# Patient Record
Sex: MALE | Race: BLACK OR AFRICAN AMERICAN | NOT HISPANIC OR LATINO | Employment: FULL TIME | URBAN - METROPOLITAN AREA
[De-identification: names, ages, dates, MRNs, and addresses within clinical notes are randomized per-mention and may not be internally consistent; named-entity substitution may affect disease eponyms.]

---

## 2017-12-06 ENCOUNTER — OFFICE VISIT (OUTPATIENT)
Dept: FAMILY MEDICINE | Facility: CLINIC | Age: 47
End: 2017-12-06
Payer: COMMERCIAL

## 2017-12-06 VITALS
HEART RATE: 83 BPM | SYSTOLIC BLOOD PRESSURE: 136 MMHG | WEIGHT: 241 LBS | HEIGHT: 73 IN | OXYGEN SATURATION: 99 % | TEMPERATURE: 98.5 F | DIASTOLIC BLOOD PRESSURE: 80 MMHG | BODY MASS INDEX: 31.94 KG/M2

## 2017-12-06 DIAGNOSIS — Z00.00 ENCOUNTER FOR ROUTINE ADULT HEALTH EXAMINATION WITHOUT ABNORMAL FINDINGS: Primary | ICD-10-CM

## 2017-12-06 DIAGNOSIS — Z72.0 TOBACCO ABUSE DISORDER: ICD-10-CM

## 2017-12-06 DIAGNOSIS — K21.9 GASTROESOPHAGEAL REFLUX DISEASE, ESOPHAGITIS PRESENCE NOT SPECIFIED: ICD-10-CM

## 2017-12-06 DIAGNOSIS — Z23 NEED FOR PROPHYLACTIC VACCINATION AND INOCULATION AGAINST INFLUENZA: ICD-10-CM

## 2017-12-06 DIAGNOSIS — R39.15 URINARY URGENCY: ICD-10-CM

## 2017-12-06 LAB
ALBUMIN SERPL-MCNC: 3.7 G/DL (ref 3.4–5)
ALP SERPL-CCNC: 90 U/L (ref 40–150)
ALT SERPL W P-5'-P-CCNC: 25 U/L (ref 0–70)
ANION GAP SERPL CALCULATED.3IONS-SCNC: 6 MMOL/L (ref 3–14)
AST SERPL W P-5'-P-CCNC: 24 U/L (ref 0–45)
BILIRUB SERPL-MCNC: 0.5 MG/DL (ref 0.2–1.3)
BUN SERPL-MCNC: 17 MG/DL (ref 7–30)
CALCIUM SERPL-MCNC: 9.3 MG/DL (ref 8.5–10.1)
CHLORIDE SERPL-SCNC: 106 MMOL/L (ref 94–109)
CO2 SERPL-SCNC: 31 MMOL/L (ref 20–32)
CREAT SERPL-MCNC: 1.21 MG/DL (ref 0.66–1.25)
GFR SERPL CREATININE-BSD FRML MDRD: 64 ML/MIN/1.7M2
GLUCOSE SERPL-MCNC: 86 MG/DL (ref 70–99)
HBA1C MFR BLD: 5.4 % (ref 4.3–6)
POTASSIUM SERPL-SCNC: 4.8 MMOL/L (ref 3.4–5.3)
PROT SERPL-MCNC: 7.9 G/DL (ref 6.8–8.8)
PSA SERPL-ACNC: 0.92 UG/L (ref 0–4)
SODIUM SERPL-SCNC: 143 MMOL/L (ref 133–144)
TSH SERPL DL<=0.005 MIU/L-ACNC: 1.51 MU/L (ref 0.4–4)

## 2017-12-06 PROCEDURE — 90471 IMMUNIZATION ADMIN: CPT | Performed by: FAMILY MEDICINE

## 2017-12-06 PROCEDURE — 99396 PREV VISIT EST AGE 40-64: CPT | Mod: 25 | Performed by: FAMILY MEDICINE

## 2017-12-06 PROCEDURE — 80053 COMPREHEN METABOLIC PANEL: CPT | Performed by: FAMILY MEDICINE

## 2017-12-06 PROCEDURE — 83036 HEMOGLOBIN GLYCOSYLATED A1C: CPT | Performed by: FAMILY MEDICINE

## 2017-12-06 PROCEDURE — 36415 COLL VENOUS BLD VENIPUNCTURE: CPT | Performed by: FAMILY MEDICINE

## 2017-12-06 PROCEDURE — 99213 OFFICE O/P EST LOW 20 MIN: CPT | Mod: 25 | Performed by: FAMILY MEDICINE

## 2017-12-06 PROCEDURE — 90686 IIV4 VACC NO PRSV 0.5 ML IM: CPT | Performed by: FAMILY MEDICINE

## 2017-12-06 PROCEDURE — 84443 ASSAY THYROID STIM HORMONE: CPT | Performed by: FAMILY MEDICINE

## 2017-12-06 PROCEDURE — 84153 ASSAY OF PSA TOTAL: CPT | Performed by: FAMILY MEDICINE

## 2017-12-06 RX ORDER — VARENICLINE TARTRATE 1 MG/1
1 TABLET, FILM COATED ORAL 2 TIMES DAILY
Qty: 56 TABLET | Refills: 2 | Status: SHIPPED | OUTPATIENT
Start: 2017-12-06 | End: 2022-10-19

## 2017-12-06 ASSESSMENT — PAIN SCALES - GENERAL: PAINLEVEL: MODERATE PAIN (5)

## 2017-12-06 NOTE — LETTER
December 7, 2017      Erik Guardado  [address]  [place] [address]            Mr. Guardado,    All of your labs were normal for you.    Please contact the clinic if you have additional questions.  Thank you.    Sincerely,    Tricia Maldonado/ak        Results for orders placed or performed in visit on 12/06/17   PSA, screen   Result Value Ref Range    PSA 0.92 0 - 4 ug/L   Hemoglobin A1c   Result Value Ref Range    Hemoglobin A1C 5.4 4.3 - 6.0 %   Comprehensive metabolic panel   Result Value Ref Range    Sodium 143 133 - 144 mmol/L    Potassium 4.8 3.4 - 5.3 mmol/L    Chloride 106 94 - 109 mmol/L    Carbon Dioxide 31 20 - 32 mmol/L    Anion Gap 6 3 - 14 mmol/L    Glucose 86 70 - 99 mg/dL    Urea Nitrogen 17 7 - 30 mg/dL    Creatinine 1.21 0.66 - 1.25 mg/dL    GFR Estimate 64 >60 mL/min/1.7m2    GFR Estimate If Black 77 >60 mL/min/1.7m2    Calcium 9.3 8.5 - 10.1 mg/dL    Bilirubin Total 0.5 0.2 - 1.3 mg/dL    Albumin 3.7 3.4 - 5.0 g/dL    Protein Total 7.9 6.8 - 8.8 g/dL    Alkaline Phosphatase 90 40 - 150 U/L    ALT 25 0 - 70 U/L    AST 24 0 - 45 U/L   TSH with free T4 reflex   Result Value Ref Range    TSH 1.51 0.40 - 4.00 mU/L

## 2017-12-06 NOTE — PROGRESS NOTES
SUBJECTIVE:   CC: Erik Guardado is an 46 year old male who presents for preventative health visit.     Healthy Habits:    Do you get at least three servings of calcium containing foods daily (dairy, green leafy vegetables, etc.)? no    Amount of exercise or daily activities, outside of work: 7 day(s) per week    Problems taking medications regularly not applicable    Medication side effects: No    Have you had an eye exam in the past two years? yes    Do you see a dentist twice per year? no    Do you have sleep apnea, excessive snoring or daytime drowsiness?yes. Getting 6 hours or less of sleep at night.    Works as a .    Urinary urgency for months.  No specific trigger.  Having dribbling and reduced stream as well. No problems with erections.    Chest burning and pain for months. Accompanied by acid taste in mouth.  Eats a lot of fast food and has 5 pop's per day with 3 energy drinks per week.    RESPIRATORY SYMPTOMS      Duration: x 1 week    Description  nasal congestion, cough, headache, fatigue/malaise and chest pain    Severity: moderate    Accompanying signs and symptoms: None    History (predisposing factors):  smoker    Precipitating or alleviating factors: None    Therapies tried and outcome:  OTC cold        Today's PHQ-2 Score:   PHQ-2 ( 1999 Pfizer) 12/6/2017 8/27/2014   Q1: Little interest or pleasure in doing things 0 0   Q2: Feeling down, depressed or hopeless 0 0   PHQ-2 Score 0 0         Abuse: Current or Past(Physical, Sexual or Emotional)- No  Do you feel safe in your environment - Yes  Social History   Substance Use Topics     Smoking status: Current Every Day Smoker     Packs/day: 0.50     Types: Cigarettes     Smokeless tobacco: Never Used     Alcohol use No     The patient does not drink >3 drinks per day nor >7 drinks per week.    Last PSA: No results found for: PSA    Reviewed orders with patient. Reviewed health maintenance and updated orders accordingly - Yes  Patient Active Problem  "List   Diagnosis     CARDIOVASCULAR SCREENING; LDL GOAL LESS THAN 160     Tobacco use disorder     Obesity, Class I, BMI 30-34.9     Gastroesophageal reflux disease, esophagitis presence not specified     Tobacco abuse disorder     Urinary urgency     No past surgical history on file.    Social History   Substance Use Topics     Smoking status: Current Every Day Smoker     Packs/day: 0.50     Types: Cigarettes     Smokeless tobacco: Never Used     Alcohol use No     Family History   Problem Relation Age of Onset     Unknown/Adopted Father      CANCER No family hx of      DIABETES No family hx of      CEREBROVASCULAR DISEASE No family hx of      Thyroid Disease No family hx of      Glaucoma No family hx of      Macular Degeneration No family hx of      Hypertension Maternal Grandmother              Reviewed and updated as needed this visit by clinical staffTobacco  Allergies  Meds  Problems         Reviewed and updated as needed this visit by Provider  Allergies  Meds  Problems              ROS:CONSTITUTIONAL:POSITIVE  for fatigue  I: NEGATIVE for worrisome rashes, moles or lesions  E: NEGATIVE for vision changes or irritation  ENT: NEGATIVE for ear, mouth and throat problems  R: NEGATIVE for significant cough or SOB  CV: NEGATIVE for chest pain, palpitations or peripheral edema  GI: POSITIVE for dyspepsia   male: decreased urinary stream, incontinence and terminal dribbling  MUSCULOSKELETAL:POSITIVE  for back pain low  and joint pain bilateral knees  N: NEGATIVE for weakness, dizziness or paresthesias  P: NEGATIVE for changes in mood or affect    OBJECTIVE:   /80  Pulse 83  Temp 98.5  F (36.9  C) (Oral)  Ht 6' 1\" (1.854 m)  Wt 241 lb (109.3 kg)  SpO2 99%  BMI 31.8 kg/m2  EXAM:  GENERAL: healthy, alert and no distress  EYES: Eyes grossly normal to inspection, PERRL and conjunctivae and sclerae normal  HENT: ear canals and TM's normal, nose and mouth without ulcers or lesions  NECK: no adenopathy, " no asymmetry, masses, or scars and thyroid normal to palpation  RESP: lungs clear to auscultation - no rales, rhonchi or wheezes  CV: regular rate and rhythm, normal S1 S2, no S3 or S4, no murmur, click or rub, no peripheral edema and peripheral pulses strong  ABDOMEN: soft, nontender, no hepatosplenomegaly, no masses and bowel sounds normal  MS: decreased lumbar lordosis, crepitus bilateral knees  SKIN: no suspicious lesions or rashes  NEURO: Normal strength and tone, mentation intact and speech normal  PSYCH: mentation appears normal, affect normal/bright    ASSESSMENT/PLAN:   1. Encounter for routine adult health examination without abnormal findings  Routine preventive discussed    2. Urinary urgency  Possible etiologies discussed - check labs and recommended decrease caffeine and nicotine  - PSA, screen  - Hemoglobin A1c  - Comprehensive metabolic panel  - TSH with free T4 reflex    3. Tobacco abuse disorder  Trial of chantix.  - varenicline (CHANTIX STARTING MONTH PAK) 0.5 MG X 11 & 1 MG X 42 tablet; Take 0.5 mg tab daily for 3 days, then 0.5 mg tab twice daily for 4 days, then 1 mg twice daily.  Dispense: 53 tablet; Refill: 0  - varenicline (CHANTIX) 1 MG tablet; Take 1 tablet (1 mg) by mouth 2 times daily  Dispense: 56 tablet; Refill: 2    4. Gastroesophageal reflux disease, esophagitis presence not specified  Discussed dietary changes    5. Need for prophylactic vaccination and inoculation against influenza    - FLU VAC, SPLIT VIRUS IM > 3 YO (QUADRIVALENT) [29672]  - Vaccine Administration, Initial [60716]    The uses and side effects, including black box warnings as appropriate, were discussed in detail.  All patient questions were answered.  The patient was instructed to call immediately if any side effects developed.     Follow up in 2 - 4 weeks if not improved.    COUNSELING:  Reviewed preventive health counseling, as reflected in patient instructions    BP Screening:   Last 3 BP Readings:    BP  "Readings from Last 3 Encounters:   12/06/17 136/80   08/27/14 117/73   02/23/14 137/84       The following was recommended to the patient:  Re-screen BP within a year and recommended lifestyle modifications       reports that he has been smoking Cigarettes.  He has been smoking about 0.50 packs per day. He has never used smokeless tobacco.  Tobacco Cessation Action Plan: Pharmacotherapies : Chantix  Estimated body mass index is 31.8 kg/(m^2) as calculated from the following:    Height as of this encounter: 6' 1\" (1.854 m).    Weight as of this encounter: 241 lb (109.3 kg).   Weight management plan: Discussed healthy diet and exercise guidelines and patient will follow up in 12 months in clinic to re-evaluate.    Counseling Resources:  ATP IV Guidelines  Pooled Cohorts Equation Calculator  FRAX Risk Assessment  ICSI Preventive Guidelines  Dietary Guidelines for Americans, 2010  USDA's MyPlate  ASA Prophylaxis  Lung CA Screening    Tricia Maldonado MD  [facility]  "

## 2017-12-06 NOTE — MR AVS SNAPSHOT
After Visit Summary   12/6/2017    Erik Guardado    MRN: [id number]           Patient Information     Date Of Birth          1970        Visit Information        Provider Department      12/6/2017 10:20 AM Tricia Lam MD [facility]        Today's Diagnoses     Encounter for routine adult health examination without abnormal findings    -  1    Need for prophylactic vaccination and inoculation against influenza        Urinary urgency        Tobacco abuse disorder          Care Instructions    At [facility], we strive to deliver an exceptional experience to you, every time we see you.  If you receive a survey in the mail, please send us back your thoughts. We really do value your feedback.    Based on your medical history, these are the current health maintenance/preventive care services that you are due for (some may have been done at this visit.)  Health Maintenance Due   Topic Date Due     EYE EXAM Q1 YEAR  08/25/2015     INFLUENZA VACCINE (SYSTEM ASSIGNED)  09/01/2017         Suggested websites for health information:  Www.[employer] : Up to date and easily searchable information on multiple topics.  Www.medlineplus.gov : medication info, interactive tutorials, watch real surgeries online  Www.familydoctor.org : good info from the Academy of Family Physicians  Www.cdc.gov : public health info, travel advisories, epidemics (H1N1)  Www.aap.org : children's health info, normal development, vaccinations  Www.health.state.[place].us : [place] dept of health, public health issues in [place], N1N1    Your care team:                            Family Medicine Internal Medicine   MD Kade Rodríguez MD Shantel Branch-Fleming, MD Katya Georgiev PA-C Nam Ho, MD Pediatrics   HORTENSIA Berger, MD Chastity Wisdom CNP, MD Deborah Mielke, MD Kim Thein, APRN CNP      Clinic hours:  Monday - Thursday 7 am-7 pm; Fridays 7 am-5 pm.   Urgent care: Monday - Friday 11 am-9 pm; Saturday and Sunday 9 am-5 pm.  Pharmacy : Monday -Thursday 8 am-8 pm; Friday 8 am-6 pm; Saturday and Sunday 9 am-5 pm.     Clinic: [phone]   Pharmacy: [phone]      Preventive Health Recommendations  Male Ages 40 to 49    Yearly exam:             See your health care provider every year in order to  o   Review health changes.   o   Discuss preventive care.    o   Review your medicines if your doctor has prescribed any.    You should be tested each year for STDs (sexually transmitted diseases) if you re at risk.     Have a cholesterol test every 5 years.     Have a colonoscopy (test for colon cancer) if someone in your family has had colon cancer or polyps before age 50.     After age 45, have a diabetes test (fasting glucose). If you are at risk for diabetes, you should have this test every 3 years.      Talk with your health care provider about whether or not a prostate cancer screening test (PSA) is right for you.    Shots: Get a flu shot each year. Get a tetanus shot every 10 years.     Nutrition:    Eat at least 5 servings of fruits and vegetables daily.     Eat whole-grain bread, whole-wheat pasta and brown rice instead of white grains and rice.     Talk to your provider about Calcium and Vitamin D.     Lifestyle    Exercise for at least 150 minutes a week (30 minutes a day, 5 days a week). This will help you control your weight and prevent disease.     Limit alcohol to one drink per day.     No smoking.     Wear sunscreen to prevent skin cancer.     See your dentist every six months for an exam and cleaning.              Follow-ups after your visit        Who to contact     If you have questions or need follow up information about today's clinic visit or your schedule please contact [facility] directly at [phone].  Normal or non-critical lab and imaging results will be communicated  "to you by [employer], letter or phone within 4 business days after the clinic has received the results. If you do not hear from us within 7 days, please contact the clinic through [employer] or phone. If you have a critical or abnormal lab result, we will notify you by phone as soon as possible.  Submit refill requests through [employer] or call your pharmacy and they will forward the refill request to us. Please allow 3 business days for your refill to be completed.          Additional Information About Your Visit        [employer] Information     [employer] lets you send messages to your doctor, view your test results, renew your prescriptions, schedule appointments and more. To sign up, go to [email or web address] . Click on \"Log in\" on the left side of the screen, which will take you to the Welcome page. Then click on \"Sign up Now\" on the right side of the page.     You will be asked to enter the access code listed below, as well as some personal information. Please follow the directions to create your username and password.     Your access code is: [email or web address]  Expires: 3/6/2018 11:03 AM     Your access code will  in 90 days. If you need help or a new code, please call your [facility] clinic or [phone].        Care EveryWhere ID     This is your Care EveryWhere ID. This could be used by other organizations to access your [facility] medical records  [id number]        Your Vitals Were     Pulse Temperature Height Pulse Oximetry BMI (Body Mass Index)       83 98.5  F (36.9  C) (Oral) 6' 1\" (1.854 m) 99% 31.8 kg/m2        Blood Pressure from Last 3 Encounters:   17 136/80   14 117/73   14 137/84    Weight from Last 3 Encounters:   17 241 lb (109.3 kg)   14 232 lb (105.2 kg)   14 232 lb 9.6 oz (105.5 kg)              We Performed the Following     Comprehensive metabolic panel     Hemoglobin A1c     PSA, screen     TSH with free T4 reflex          Today's Medication Changes        "   These changes are accurate as of: 12/6/17 11:03 AM.  If you have any questions, ask your nurse or doctor.               These medicines have changed or have updated prescriptions.        Dose/Directions    * varenicline 0.5 MG X 11 & 1 MG X 42 tablet   Commonly known as:  CHANTIX STARTING MONTH PAK   This may have changed:  Another medication with the same name was added. Make sure you understand how and when to take each.   Used for:  Tobacco abuse disorder   Changed by:  Tricia Lam MD        Take 0.5 mg tab daily for 3 days, then 0.5 mg tab twice daily for 4 days, then 1 mg twice daily.   Quantity:  53 tablet   Refills:  0       * varenicline 1 MG tablet   Commonly known as:  CHANTIX   This may have changed:  You were already taking a medication with the same name, and this prescription was added. Make sure you understand how and when to take each.   Used for:  Tobacco abuse disorder   Changed by:  Tricia Lam MD        Dose:  1 mg   Take 1 tablet (1 mg) by mouth 2 times daily   Quantity:  56 tablet   Refills:  2       * Notice:  This list has 2 medication(s) that are the same as other medications prescribed for you. Read the directions carefully, and ask your doctor or other care provider to review them with you.         Where to get your medicines      These medications were sent to [id number] - [place], [place] - [address] [facility]  [address] [place] [id number]     Phone:  [phone]     varenicline 0.5 MG X 11 & 1 MG X 42 tablet    varenicline 1 MG tablet                Primary Care Provider Office Phone # Fax #    Sravan Huitron [phone] [address] [facility] [address]        Equal Access to Services     [facility] : Hadii cecilia Greer, flaquita ceja, qaybta etelvina salas, ellis kovacs. So [facility] [phone].    ATENCIÓN: Si mitch baeza, malcolm perales cast  disposición servicios gratuitos de asistencia lingüística. Rod kline [phone].    We comply with applicable federal civil rights laws and [place] laws. We do not discriminate on the basis of race, color, national origin, age, disability, sex, sexual orientation, or gender identity.            Thank you!     Thank you for choosing [facility]  for your care. Our goal is always to provide you with excellent care. Hearing back from our patients is one way we can continue to improve our services. Please take a few minutes to complete the written survey that you may receive in the mail after your visit with us. Thank you!             Your Updated Medication List - Protect others around you: Learn how to safely use, store and throw away your medicines at www.disposemymeds.org.          This list is accurate as of: 12/6/17 11:03 AM.  Always use your most recent med list.                   Brand Name Dispense Instructions for use Diagnosis    * varenicline 0.5 MG X 11 & 1 MG X 42 tablet    CHANTIX STARTING MONTH BONG    53 tablet    Take 0.5 mg tab daily for 3 days, then 0.5 mg tab twice daily for 4 days, then 1 mg twice daily.    Tobacco abuse disorder       * varenicline 1 MG tablet    CHANTIX    56 tablet    Take 1 tablet (1 mg) by mouth 2 times daily    Tobacco abuse disorder       * Notice:  This list has 2 medication(s) that are the same as other medications prescribed for you. Read the directions carefully, and ask your doctor or other care provider to review them with you.

## 2017-12-06 NOTE — NURSING NOTE
"Chief Complaint   Patient presents with     Physical       Initial /83  Pulse 83  Temp 98.5  F (36.9  C) (Oral)  Ht 6' 1\" (1.854 m)  Wt 241 lb (109.3 kg)  SpO2 99%  BMI 31.8 kg/m2 Estimated body mass index is 31.8 kg/(m^2) as calculated from the following:    Height as of this encounter: 6' 1\" (1.854 m).    Weight as of this encounter: 241 lb (109.3 kg).  Medication Reconciliation: complete   Bernabe VALDERRAMA      "

## 2017-12-06 NOTE — PROGRESS NOTES

## 2017-12-07 NOTE — PROGRESS NOTES
MrAleah Guardado,    All of your labs were normal for you.    Please contact the clinic if you have additional questions.  Thank you.    Sincerely,    Tricia Maldonado

## 2019-10-24 ENCOUNTER — TELEPHONE (OUTPATIENT)
Dept: FAMILY MEDICINE | Facility: CLINIC | Age: 49
End: 2019-10-24

## 2019-10-24 NOTE — TELEPHONE ENCOUNTER
Panel Management Review   One phone call and send letter if unable to reach them or [employer] message and send letter if not read after 2 weeks (You will get a message to your inbasket)      BP Readings from Last 1 Encounters:   12/06/17 136/80        Health Maintenance Due   Topic Date Due     COLONOSCOPY  12/17/1978     PNEUMOCOCCAL IMMUNIZATION 19-64 MEDIUM RISK (1 of 1 - PPSV23) 12/17/1987     EYE EXAM  08/25/2015     PREVENTIVE CARE VISIT  12/06/2018     PHQ-2  01/01/2019     LIPID  08/27/2019     INFLUENZA VACCINE (1) 09/01/2019     ZOSTER IMMUNIZATION (1 of 2) 12/17/2018        Fail List measure: BMI        Patient is due/failing the following:   BMI    Action needed:   Patient needs office visit for Preventive Care Visit.    Type of outreach:    Phone, spoke to patient.  Appt 10/30 2:20p.m.    Questions for provider review:    None                                                                                                                    Keli Johnson

## 2021-08-13 ENCOUNTER — OFFICE VISIT (OUTPATIENT)
Dept: URGENT CARE | Facility: URGENT CARE | Age: 51
End: 2021-08-13
Payer: COMMERCIAL

## 2021-08-13 VITALS
RESPIRATION RATE: 16 BRPM | DIASTOLIC BLOOD PRESSURE: 83 MMHG | OXYGEN SATURATION: 98 % | WEIGHT: 243.4 LBS | BODY MASS INDEX: 32.11 KG/M2 | SYSTOLIC BLOOD PRESSURE: 148 MMHG | TEMPERATURE: 97.6 F | HEART RATE: 84 BPM

## 2021-08-13 DIAGNOSIS — S09.90XA INJURY OF HEAD, INITIAL ENCOUNTER: Primary | ICD-10-CM

## 2021-08-13 DIAGNOSIS — S16.1XXA STRAIN OF NECK MUSCLE, INITIAL ENCOUNTER: ICD-10-CM

## 2021-08-13 DIAGNOSIS — S06.0X0A CONCUSSION WITHOUT LOSS OF CONSCIOUSNESS, INITIAL ENCOUNTER: ICD-10-CM

## 2021-08-13 PROCEDURE — 99203 OFFICE O/P NEW LOW 30 MIN: CPT | Performed by: PHYSICIAN ASSISTANT

## 2021-08-13 ASSESSMENT — ENCOUNTER SYMPTOMS
FEVER: 0
SORE THROAT: 0
ARTHRALGIAS: 1
VOMITING: 0
WHEEZING: 0
CHEST TIGHTNESS: 0
CARDIOVASCULAR NEGATIVE: 1
CHILLS: 0
HEMATURIA: 0
COUGH: 0
RESPIRATORY NEGATIVE: 1
NAUSEA: 0
DIARRHEA: 0
CONSTITUTIONAL NEGATIVE: 1
HEADACHES: 1
PALPITATIONS: 0
FREQUENCY: 0
GASTROINTESTINAL NEGATIVE: 1
DYSURIA: 0
SHORTNESS OF BREATH: 0
ALLERGIC/IMMUNOLOGIC NEGATIVE: 1
MYALGIAS: 0
ABDOMINAL PAIN: 0

## 2021-08-13 ASSESSMENT — PAIN SCALES - GENERAL: PAINLEVEL: MODERATE PAIN (5)

## 2021-08-13 NOTE — PROGRESS NOTES
Chief Complaint:    Chief Complaint   Patient presents with     Work Comp     Patient states that two panels fell over from the back of a truck about 45lbs each- hitting him in the back of the head- patient is having a headache- feels a little off- having shoulder and upper back pain. Patient bit down on his tounge when it happened, that is doing a little better.          Medical Decision Making:    Vital signs reviewed by Allen Flores PA-C  BP (!) 148/83   Pulse 84   Temp 97.6  F (36.4  C) (Tympanic)   Resp 16   Wt 110.4 kg (243 lb 6.4 oz)   SpO2 98%   BMI 32.11 kg/m      Differential Diagnosis:  Concussion, neck sprain, neck fracture     ASSESSMENT:     1. Injury of head, initial encounter    2. Concussion without loss of consciousness, initial encounter    3. Strain of neck muscle, initial encounter           PLAN:     Patient is in no acute distress.  Headache is improving.  No neck pain.  Neuro exam was benign  Ice the affected areas.  Ibuprofen for pain.  Patient instructed to follow up with PCP in 1 week if symptoms are not improving.  Sooner if symptoms worsen.  Worrisome symptoms discussed with instructions to go to the ED.  Patient verbalized understanding and agreed with this plan.    Labs:     No results found for any visits on 08/13/21.    Current Meds:    Current Outpatient Medications:      varenicline (CHANTIX STARTING MONTH PAK) 0.5 MG X 11 & 1 MG X 42 tablet, Take 0.5 mg tab daily for 3 days, then 0.5 mg tab twice daily for 4 days, then 1 mg twice daily. (Patient not taking: Reported on 8/13/2021), Disp: 53 tablet, Rfl: 0     varenicline (CHANTIX) 1 MG tablet, Take 1 tablet (1 mg) by mouth 2 times daily (Patient not taking: Reported on 8/13/2021), Disp: 56 tablet, Rfl: 2    Allergies:  No Known Allergies    SUBJECTIVE    HPI: Erik Guardado is an 50 year old male who presents for evaluation and treatment of head injury.  Patient was at work and had some items fall off the back of a truck and  strike him on the back of the head.  He did not lose consciousness.  He is complaining of a headache, and some neck pain.  He did bite his tongue, but that pain has resolved.  He did not lose consciousness.  He denies any dizziness, nausea or vomiting.  No Hx of head injury in the past.  No numbness, tingling or weakness in the extremities.    ROS:      Review of Systems   Constitutional: Negative.  Negative for chills and fever.   HENT: Negative.  Negative for sore throat.    Respiratory: Negative.  Negative for cough, chest tightness, shortness of breath and wheezing.    Cardiovascular: Negative.  Negative for chest pain and palpitations.   Gastrointestinal: Negative.  Negative for abdominal pain, diarrhea, nausea and vomiting.   Genitourinary: Negative for dysuria, frequency, hematuria and urgency.   Musculoskeletal: Positive for arthralgias. Negative for myalgias.   Skin: Negative for rash.   Allergic/Immunologic: Negative.  Negative for immunocompromised state.   Neurological: Positive for headaches.        Family History   Family History   Problem Relation Age of Onset     Unknown/Adopted Father      Cancer No family hx of      Diabetes No family hx of      Cerebrovascular Disease No family hx of      Thyroid Disease No family hx of      Glaucoma No family hx of      Macular Degeneration No family hx of      Hypertension Maternal Grandmother        Social History  Social History     Socioeconomic History     Marital status: Single     Spouse name: Not on file     Number of children: Not on file     Years of education: Not on file     Highest education level: Not on file   Occupational History     Not on file   Tobacco Use     Smoking status: Former Smoker     Packs/day: 0.50     Types: Cigarettes     Smokeless tobacco: Never Used   Substance and Sexual Activity     Alcohol use: No     Drug use: No     Sexual activity: Yes     Partners: Female   Other Topics Concern     Parent/sibling w/ CABG, MI or angioplasty  before 65F 55M? No   Social History Narrative     Not on file     Social Determinants of Health     Financial Resource Strain:      Difficulty of Paying Living Expenses:    Food Insecurity:      Worried About Running Out of Food in the Last Year:      Ran Out of Food in the Last Year:    Transportation Needs:      Lack of Transportation (Medical):      Lack of Transportation (Non-Medical):    Physical Activity:      Days of Exercise per Week:      Minutes of Exercise per Session:    Stress:      Feeling of Stress :    Social Connections:      Frequency of Communication with Friends and Family:      Frequency of Social Gatherings with Friends and Family:      Attends Jewish Services:      Active Member of Clubs or Organizations:      Attends Club or Organization Meetings:      Marital Status:    Intimate Partner Violence:      Fear of Current or Ex-Partner:      Emotionally Abused:      Physically Abused:      Sexually Abused:         Surgical History:  No past surgical history on file.     Problem List:  Patient Active Problem List   Diagnosis     CARDIOVASCULAR SCREENING; LDL GOAL LESS THAN 160     Tobacco use disorder     Obesity, Class I, BMI 30-34.9     Gastroesophageal reflux disease, esophagitis presence not specified     Tobacco abuse disorder     Urinary urgency           OBJECTIVE:     Vital signs noted and reviewed by Allen Flores PA-C  BP (!) 148/83   Pulse 84   Temp 97.6  F (36.4  C) (Tympanic)   Resp 16   Wt 110.4 kg (243 lb 6.4 oz)   SpO2 98%   BMI 32.11 kg/m       PEFR:    Physical Exam  Vitals and nursing note reviewed.   Constitutional:       General: He is not in acute distress.     Appearance: He is well-developed. He is not ill-appearing, toxic-appearing or diaphoretic.   HENT:      Head: Normocephalic and atraumatic.      Right Ear: Hearing, tympanic membrane, ear canal and external ear normal. Tympanic membrane is not perforated, erythematous, retracted or bulging.      Left Ear:  Hearing, tympanic membrane, ear canal and external ear normal. Tympanic membrane is not perforated, erythematous, retracted or bulging.      Nose: Nose normal. No mucosal edema, congestion or rhinorrhea.      Mouth/Throat:      Pharynx: No oropharyngeal exudate or posterior oropharyngeal erythema.      Tonsils: No tonsillar exudate or tonsillar abscesses. 0 on the right. 0 on the left.   Eyes:      Pupils: Pupils are equal, round, and reactive to light.   Cardiovascular:      Rate and Rhythm: Normal rate and regular rhythm.      Heart sounds: Normal heart sounds, S1 normal and S2 normal. Heart sounds not distant. No murmur heard.   No friction rub. No gallop.    Pulmonary:      Effort: Pulmonary effort is normal. No respiratory distress.      Breath sounds: Normal breath sounds. No decreased breath sounds, wheezing, rhonchi or rales.   Abdominal:      General: Bowel sounds are normal. There is no distension.      Palpations: Abdomen is soft.      Tenderness: There is no abdominal tenderness.   Musculoskeletal:      Cervical back: Normal range of motion and neck supple. No edema, erythema, rigidity or crepitus. No pain with movement, spinous process tenderness or muscular tenderness. Normal range of motion.   Lymphadenopathy:      Cervical: No cervical adenopathy.   Skin:     General: Skin is warm and dry.      Findings: No rash.   Neurological:      Mental Status: He is alert.      GCS: GCS eye subscore is 4. GCS verbal subscore is 5. GCS motor subscore is 6.      Cranial Nerves: Cranial nerves are intact. No cranial nerve deficit.      Sensory: Sensation is intact.      Motor: Motor function is intact. No weakness, atrophy or abnormal muscle tone.      Coordination: Coordination is intact. Coordination normal.      Gait: Gait is intact. Gait normal.   Psychiatric:         Attention and Perception: He is attentive.         Speech: Speech normal.         Behavior: Behavior normal. Behavior is cooperative.          Thought Content: Thought content normal.         Judgment: Judgment normal.             Allen Flores PA-C  8/13/2021, 4:17 PM

## 2021-08-13 NOTE — PATIENT INSTRUCTIONS
"  Patient Education   Concussion Discharge Instructions  You were seen today for signs of a concussion. The symptoms will vary, depending on the nature of your injury and your health. You may have: headache, confusion, nausea (feel sick to your stomach), vomiting (throwing up) and problems with memory, concentrating or sleep. You may feel dizzy, irritable, and tired.   Children and teens may need help from their parents, teachers and coaches to watch for symptoms as they recover.  Follow-up  It is important for you to see a doctor for follow-up care to see how you are recovering. Please see your primary doctor within the next 5 to 7 days. You may have also been referred to the Concussion  service. They will contact you and arrange a follow-up visit if needed. If you need help sooner, you may call them at [phone].  Warning signs  Call your doctor or come back to Emergency if you suddenly have any of these symptoms:    Headaches that get worse    Feeling more and more drowsy    You keep repeating yourself    Strange behavior    Seizures    Repeat vomiting (throwing up)    Trouble walking    Growing confusion    Feeling more irritable    Neck pain that gets worse    Slurred speech    Weakness or numbness    Loss of consciousness    Fluid or blood coming from ears or nose  Self-care    Get lots of rest and get enough sleep at night. Take daytime naps or rest if you feel tired.    Limit physical activity and \"thinking\" activities. These can make symptoms worse.  ? Physical activity includes gym, sports, weight training, running, exercise and heavy lifting.  ? Thinking activities include homework, class work, job-related work and screen time (phone, computer, tablet, TV and video games).    Stick to a healthy diet and drink lots of fluids.    As symptoms improve, you may slowly return to your daily activities. If symptoms get worse   or return, reduce your activity.    Know that it is normal to feel sad and " frustrated when you do not feel right and are less active.  Going back to work    Your care team will tell you when you are ready to return to work.    Limit the amount of work you do soon after your injury. This may speed healing. Take breaks if your symptoms get worse. You should also reduce your physical activity as well as activities that require a lot of thinking until you see your doctor.    You may need shorter work days and a lighter workload.    Avoid heavy lifting, working with machinery, driving and working at heights until your symptoms are gone or you are cleared by a doctor.  Returning to sports    Never return to play if you have any symptoms. A full recovery will reduce the chances of getting hurt again. Remember, it is better to miss one or two games than a whole season.    You should rest from all physical activity until you see your doctor. Generally, if all symptoms have completely cleared, your doctor can help guide you to slowly return to sports. If symptoms return or worsen, stop the activity and see your doctor.    Important: If you are in an organized sport and under age 18, you will need written consent from a healthcare provider before you return to sports. Typically, this will be your primary care or sports medicine doctor. Please make an appointment.  Going back to school    If you are still having symptoms, you may need extra help at school.    Tell your teachers and school nurse about your injury and symptoms. Ask them to watch for problems with learning, memory and concentrating. Symptoms may get worse when you do schoolwork, and you may become more irritable.    You may need shorter school days, a reduced workload, and to postpone testing.    Do not drive or take gym class (physical activity) until cleared by a doctor.    For informational purposes only. Not to replace the advice of your health care provider.   2009 [employer]. Used with permission.  "This form is adapted from the \"Heads Up: Brain Injury in Your Practice\" tool kit developed by the Centers for Disease Control and Prevention (CDC). All rights reserved. [facility]. Clinically reviewed by [employer]. [employer] 541845 - Rev 11/20.       Patient Education     Neck Sprain or Strain  A sudden force that causes turning or bending of the neck can cause sprain or strain. An example would be the force from a car accident. This can stretch or tear muscles called a strain. It can also stretch or tear ligaments called a sprain. Either of these can cause neck pain. Sometimes neck pain occurs after a simple awkward movement. In either case, muscle spasm is commonly present and contributes to the pain.   Unless you had a forceful physical injury (for example, a car accident or fall), X-rays are often not ordered for the initial evaluation of neck pain. If pain continues and does not respond to medical treatment, X-rays and other tests may be done later.  Home care    You may feel more soreness and spasm the first few days after the injury. Rest until symptoms start to improve.    When lying down, use a comfortable pillow or a rolled towel that supports the head and keeps the spine in a neutral position. The position of the head should not be tilted forward or backward.    Apply an ice pack over the injured area for 15 to 20 minutes every 3 to 6 hours. Do this for the first 24 to 48 hours. You can make an ice pack by filling a plastic bag that seals at the top with ice cubes and then wrapping it with a thin towel. After 48 hours, apply heat (warm shower or warm bath) for 15 to 20 minutes several times a day, or alternate ice and heat.    You may use over-the-counter pain medicine to control pain, unless another pain medicine was prescribed. If you have chronic liver or kidney disease or ever had a stomach ulcer or gastrointestinal bleeding, talk with your healthcare provider before " using these medicines.    If a soft cervical collar was prescribed, only ear it for periods of increased pain. It should not be worn for more than 3 hours a day, or for longer than 1 to 2 weeks.  Follow-up care  Follow up with your healthcare provider, or as directed. Physical therapy may be needed.  Sometimes fractures don t show up on the first X-ray. Bruises and sprains can sometimes hurt as much as a fracture. These injuries can take time to heal completely. If your symptoms don t improve or they get worse, talk with your healthcare provider. You may need a repeat X-ray or other tests. If X-rays were taken, you will be told of any new findings that may affect your care.  Call 911  Call 911 if you have:    Neck swelling, difficulty or painful swallowing    Trouble breathing    Chest pain  When to seek medical advice  Call your healthcare provider right away if any of these occur:    Pain becomes worse or spreads into your arms or legs    Weakness or numbness in one or both arms or legs  [employer] last reviewed this educational content on 5/1/2018 2000-2021 [employer]. All rights reserved. This information is not intended as a substitute for professional medical care. Always follow your healthcare professional's instructions.

## 2021-09-01 ENCOUNTER — OFFICE VISIT (OUTPATIENT)
Dept: URGENT CARE | Facility: URGENT CARE | Age: 51
End: 2021-09-01
Payer: COMMERCIAL

## 2021-09-01 VITALS
RESPIRATION RATE: 20 BRPM | TEMPERATURE: 101.8 F | DIASTOLIC BLOOD PRESSURE: 91 MMHG | SYSTOLIC BLOOD PRESSURE: 145 MMHG | HEART RATE: 91 BPM | OXYGEN SATURATION: 100 %

## 2021-09-01 DIAGNOSIS — Z20.822 SUSPECTED COVID-19 VIRUS INFECTION: ICD-10-CM

## 2021-09-01 DIAGNOSIS — R05.9 COUGH: Primary | ICD-10-CM

## 2021-09-01 PROCEDURE — U0003 INFECTIOUS AGENT DETECTION BY NUCLEIC ACID (DNA OR RNA); SEVERE ACUTE RESPIRATORY SYNDROME CORONAVIRUS 2 (SARS-COV-2) (CORONAVIRUS DISEASE [COVID-19]), AMPLIFIED PROBE TECHNIQUE, MAKING USE OF HIGH THROUGHPUT TECHNOLOGIES AS DESCRIBED BY CMS-2020-01-R: HCPCS | Performed by: PHYSICIAN ASSISTANT

## 2021-09-01 PROCEDURE — U0005 INFEC AGEN DETEC AMPLI PROBE: HCPCS | Performed by: PHYSICIAN ASSISTANT

## 2021-09-01 PROCEDURE — 99213 OFFICE O/P EST LOW 20 MIN: CPT | Performed by: PHYSICIAN ASSISTANT

## 2021-09-01 NOTE — LETTER
September 1, 2021  RE:  Erik Guardado                                                                                                                  [address]  [place] [address]      To whom it may concern:    I evaluated Erik Guardado on September 1, 2021. Erik Guardado should be excused from work/school.     They should let their workplace manager and staffing office know when their quarantine ends.    We can not give an exact date as it depends on the information below. They can calculate this on their own or work with their manager/staffing office to calculate this. (For example if they were exposed on 10/04, they would have to quarantine for 14 full days. That would be through 10/18. They could return on 10/19.)    Quarantine Guidelines:      If patient receives a positive COVID-19 test result, they should follow the guidance of those who are giving the results. Usually the return to work is 10 (or in some cases 20 days from symptom onset.) If they work at [employer], they must be cleared by Employee Occupational Health and Safety to return to work.        If patient receives a negative COVID-19 test result and did not have a high risk exposure to someone with a known positive COVID-19 test, they can return to work once they're free of fever for 24 hours without fever-reducing medication and their symptoms are improving or resolved.      If patient receives a negative COVID-19 test and if they had a high risk exposure to someone who has tested positive for COVID-19 then they can return to work 14 days after their last contact with the positive individual    Note: If there is ongoing exposure to the covid positive person, this quarantine period may be longer than 14 days. (For example, if they are continually exposed to their child, who tested positive and cannot isolate from them, then the quarantine of 7-14 days can't start until their child is no longer contagious. This is typically 10 days  from onset to the child's symptoms. So the total duration may be 17-24 days in this case.)     Sincerely,  Morgan Pepe PA-C

## 2021-09-02 NOTE — PROGRESS NOTES
Chief Complaint   Patient presents with     Cough     Bodyache, headache, chest pain from coughing, fatigue and chills       ASSESSMENT/PLAN:  Erik was seen today for cough and covid concern.    Diagnoses and all orders for this visit:    Cough  -     Symptomatic COVID-19 Virus (Coronavirus) by PCR Nose    Suspected COVID-19 virus infection  -     COVID-19 GetWell Loop Referral    Patient likely has COVID-19 based off of exposure and symptoms.  Clear lung sounds on exam and stable vitals.  Treat supportively.  Contagion precautions discussed.  Not concerned for blood clots or PE at this time more information in AVS provided to patient  - N95, surgical mask, gown, gloves and face shield were worn.  I  Morgan Pepe PA-C  20 minutes spent on the date of the encounter doing chart review, history and exam, documentation and further activities per the note    SUBJECTIVE:  Erik is a 50 year old male who presents to urgent care with 1 to 2 days of fever, chills, myalgias, fatigue, cough, sore throat, mild nasal congestion and general malaise.  He lives with someone who tested positive for COVID-19.  He has not been vaccinated.  He has some intermittent chest pain but no shortness of breath or dyspnea on exertion.  No rash or vomiting.  No leg swelling or tenderness    ROS: Pertinent ROS neg other than the symptoms noted above in the HPI.     OBJECTIVE:  BP (!) 145/91 (BP Location: Right arm, Patient Position: Sitting, Cuff Size: Adult Large)   Pulse 91   Temp (!) 101.8  F (38.8  C) (Oral)   Resp 20   SpO2 100%    GENERAL: healthy, alert and no distress  EYES: Eyes grossly normal to inspection, PERRL and conjunctivae and sclerae normal  HENT: Patent oropharynx  NECK: no adenopathy, nontender  RESP: lungs clear to auscultation - no rales, rhonchi or wheezes  CV: regular rate and rhythm, normal S1 S2, no S3 or S4, no murmur, click or rub, no peripheral edema and peripheral pulses strong  MS: Homans test negative, no  lower extremity edema or calf tenderness  SKIN: no suspicious lesions or rashes    DIAGNOSTICS    No results found for any visits on 09/01/21.     Current Outpatient Medications   Medication     varenicline (CHANTIX STARTING MONTH BONG) 0.5 MG X 11 & 1 MG X 42 tablet     varenicline (CHANTIX) 1 MG tablet     No current facility-administered medications for this visit.      Patient Active Problem List   Diagnosis     CARDIOVASCULAR SCREENING; LDL GOAL LESS THAN 160     Tobacco use disorder     Obesity, Class I, BMI 30-34.9     Gastroesophageal reflux disease, esophagitis presence not specified     Tobacco abuse disorder     Urinary urgency      No past medical history on file.  No past surgical history on file.  Family History   Problem Relation Age of Onset     Unknown/Adopted Father      Cancer No family hx of      Diabetes No family hx of      Cerebrovascular Disease No family hx of      Thyroid Disease No family hx of      Glaucoma No family hx of      Macular Degeneration No family hx of      Hypertension Maternal Grandmother      Social History     Tobacco Use     Smoking status: Former Smoker     Packs/day: 0.50     Types: Cigarettes     Smokeless tobacco: Never Used   Substance Use Topics     Alcohol use: No              The plan of care was discussed with the patient. They understand and agree with the course of treatment prescribed. A printed summary was given including instructions and medications.  The use of Dragon[employer] dictation services may have been used to construct the content in this note; any grammatical or spelling errors are non-intentional. Please contact the author of this note directly if you are in need of any clarification.

## 2021-09-02 NOTE — PATIENT INSTRUCTIONS
Your symptoms show that you may have coronavirus (COVID-19). This illness can cause fever, cough and trouble breathing. Many people get a mild case and get better on their own. Some people can get very sick.    Return to work/school/ guidance:  Please let your workplace manager and staffing office know when your quarantine ends     We can t give you an exact date as it depends on the above. You can calculate this on your own or work with your manager/staffing office to calculate this. (For example if you were exposed on 10/4, you would have to quarantine for 14 full days. That would be through 10/18. You could return on 10/19.)      If you receive a positive COVID-19 test result, follow the guidance of the those who are giving you the results. Usually the return to work is 10 (or in some cases 20 days from symptom onset.) If you work at [employer], you must also be cleared by Employee Occupational Health and Safety to return to work.        If you receive a negative COVID-19 test result and did not have a high risk exposure to someone with a known positive COVID-19 test, you can return to work once you're free of fever for 24 hours without fever-reducing medication and your symptoms are improving or resolved.      If you receive a negative COVID-19 test and If you had a high risk exposure to someone who has tested positive for COVID-19 then you can return to work 14 days after your last contact with the positive individual    Note: If you have ongoing exposure to the covid positive person, this quarantine period may be more than 14 days. (For example, if you are continued to be exposed to your child who tested positive and cannot isolate from them, then the quarantine of 7-14 days can't start until your child is no longer contagious. This is typically 10 days from onset of the child's symptoms. So the total duration may be 17-24 days in this case.)    Sign up for Ja[employer].   We know it's scary to  hear that you might have COVID-19. We want to track your symptoms to make sure you're okay over the next 2 weeks. Please look for an email from [employer]--this is a free, online program that we'll use to keep in touch. To sign up, follow the link in the email you will receive. Learn more at [email or web address]    How can I take care of myself?    Get lots of rest. Drink extra fluids (unless a doctor has told you not to)    Take Tylenol (acetaminophen) or ibuprofen for fever or pain. If you have liver or kidney problems, ask your family doctor if it's okay to take Tylenol o ibuprofen    If you have other health problems (like cancer, heart failure, an organ transplant or severe kidney disease): Call your specialty clinic if you don't feel better in the next 2 days.    Know when to call 911. Emergency warning signs include:  o Trouble breathing or shortness of breath  o Pain or pressure in the chest that doesn't go away  o Feeling confused like you haven't felt before, or not being able to wake up  o Bluish-colored lips or face    Where can I get more information?  [facility] - About COVID-19:   [email or web address]    CDC - What to Do If You're Sick:   www.cdc.gov/coronavirus/2019-ncov/about/steps-when-sick.html    September 1, 2021  RE:  Erik Guardado                                                                                                                  [id number]  [facility] [address]      To whom it may concern:    I evaluated Erik Guardado on September 1, 2021. Erik Guardado should be excused from work/school.     They should let their workplace manager and staffing office know when their quarantine ends.    We can not give an exact date as it depends on the information below. They can calculate this on their own or work with their manager/staffing office to calculate this. (For example if they were exposed on 10/04, they would have to quarantine for 14 full days.  That would be through 10/18. They could return on 10/19.)    Quarantine Guidelines:      If patient receives a positive COVID-19 test result, they should follow the guidance of those who are giving the results. Usually the return to work is 10 (or in some cases 20 days from symptom onset.) If they work at [employer], they must be cleared by Employee Occupational Health and Safety to return to work.        If patient receives a negative COVID-19 test result and did not have a high risk exposure to someone with a known positive COVID-19 test, they can return to work once they're free of fever for 24 hours without fever-reducing medication and their symptoms are improving or resolved.      If patient receives a negative COVID-19 test and if they had a high risk exposure to someone who has tested positive for COVID-19 then they can return to work 14 days after their last contact with the positive individual    Note: If there is ongoing exposure to the covid positive person, this quarantine period may be longer than 14 days. (For example, if they are continually exposed to their child, who tested positive and cannot isolate from them, then the quarantine of 7-14 days can't start until their child is no longer contagious. This is typically 10 days from onset to the child's symptoms. So the total duration may be 17-24 days in this case.)     Sincerely,  Morgan Pepe PA-C            Patient Education   Discharge Instructions for COVID-19 Patients  You have--or may have--COVID-19. Please follow the instructions listed below.   If you have a weakened immune system, discuss with your doctor any other actions you need to take.  How can I protect others?  If you have symptoms (fever, cough, body aches or trouble breathing):    Stay home and away from others (self-isolate) until:  ? Your other symptoms have resolved (gotten better). And   ? You've had no fever--and no medicine that reduces fever--for 1 full day (24  "hours). And   ? At least 10 days have passed since your symptoms started. (You may need to wait 20 days. Follow the advice of your care team.)  If you don't show symptoms, but testing showed that you have COVID-19:    Stay home and away from others (self-isolate) until at least 10 days have passed since the date of your first positive COVID-19 test.  During this time    Stay in your own room, even for meals. Use your own bathroom if you can.    Stay away from others in your home. No hugging, kissing or shaking hands. No visitors.    Don't go to work, school or anywhere else.    Clean \"high touch\" surfaces often (doorknobs, counters, handles). Use household cleaning spray or wipes.    You'll find a full list of  on the EPA website: www.epa.gov/pesticide-registration/list-n-disinfectants-use-against-sars-cov-2.    Cover your mouth and nose with a mask or other face covering to avoid spreading germs.    Wash your hands and face often. Use soap and water.    Caregivers in these groups are at risk for severe illness due to COVID-19:  ? People 65 years and older  ? People who live in a nursing home or long-term care facility  ? People with chronic disease (lung, heart, cancer, diabetes, kidney, liver, immunologic)  ? People who have a weakened immune system, including those who:    Are in cancer treatment    Take medicine that weakens the immune system, such as corticosteroids    Had a bone marrow or organ transplant    Have an immune deficiency    Have poorly controlled HIV or AIDS    Are obese (body mass index of 40 or higher)    Smoke regularly    Caregivers should wear gloves while washing dishes, handling laundry and cleaning bedrooms and bathrooms.    Use caution when washing and drying laundry: Don't shake dirty laundry and use the warmest water setting that you can.    For more tips on managing your health at home, go to www.cdc.gov/coronavirus/2019-ncov/downloads/10Things.pdf.  How can I take care of " myself at home?  1. Get lots of rest. Drink extra fluids (unless a doctor has told you not to).  2. Take Tylenol (acetaminophen) for fever or pain. If you have liver or kidney problems, ask your family doctor if it's okay to take Tylenol.   Adults can take either:   ? 650 mg (two 325 mg pills) every 4 to 6 hours, or   ? 1,000 mg (two 500 mg pills) every 8 hours as needed.  ? Note: Don't take more than 3,000 mg in one day. Acetaminophen is found in many medicines (both prescribed and over-the-counter medicines). Read all labels to be sure you don't take too much.   For children, check the Tylenol bottle for the right dose. The dose is based on the child's age or weight.  3. If you have other health problems (like cancer, heart failure, an organ transplant or severe kidney disease): Call your specialty clinic if you don't feel better in the next 2 days.  4. Know when to call 911. Emergency warning signs include:  ? Trouble breathing or shortness of breath  ? Pain or pressure in the chest that doesn't go away  ? Feeling confused like you haven't felt before, or not being able to wake up  ? Bluish-colored lips or face  5. Your doctor may have prescribed a blood thinner medicine. Follow their instructions.  Where can I get more information?    [facility] - About COVID-19:   [email or web address]    CDC - What to Do If You're Sick: www.cdc.gov/coronavirus/2019-ncov/about/steps-when-sick.html    CDC - Ending Home Isolation: www.cdc.gov/coronavirus/2019-ncov/hcp/disposition-in-home-patients.html    CDC - Caring for Someone: www.cdc.gov/coronavirus/2019-ncov/if-you-are-sick/care-for-someone.html    [facility] - Interim Guidance for Hospital Discharge to Home: www.health.[facility].[place]/diseases/coronavirus/hcp/hospdischarge.pdf    Below are the COVID-19 hotlines at the [facility] of Health ([facility]). Interpreters are available.  ? For health questions: Call 405-631-0433 or [phone] (7 a.m. to 7  p.m.)  ? For questions about schools and childcare: Call 144-384-1083 or 1-531.780.4139 (7 a.m. to 7 p.m.)    For informational purposes only. Not to replace the advice of your health care provider. Clinically reviewed by Dr. Gen Watt.   Copyright   2020 [facility]. All rights reserved. [employer] AS 020805 - REV 01/05/21.

## 2021-09-03 ENCOUNTER — TELEPHONE (OUTPATIENT)
Dept: URGENT CARE | Facility: URGENT CARE | Age: 51
End: 2021-09-03

## 2021-09-03 LAB — SARS-COV-2 RNA RESP QL NAA+PROBE: POSITIVE

## 2021-09-03 NOTE — TELEPHONE ENCOUNTER
"-Coronavirus (COVID-19) Notification    Caller Name (Patient, parent, daughter/son, grandparent, etc)  patient    Reason for call  Notify of Positive Coronavirus (COVID-19) lab results, assess symptoms,  review  [employer] [place] recommendations    Lab Result    Lab test:  2019-nCoV rRt-PCR or SARS-CoV-2 PCR    Oropharyngeal AND/OR nasopharyngeal swabs is POSITIVE for 2019-nCoV RNA/SARS-COV-2 PCR (COVID-19 virus)    RN Recommendations/Instructions per [facility] Coronavirus COVID-19 recommendations    Brief introduction script  Introduce self then review script:  \"I am calling on behalf of [employer].  We were notified that your Coronavirus test (COVID-19) for was POSITIVE for the virus.  I have some information to relay to you but first I wanted to mention that the [place] Dept of Health will be contacting you shortly [it's possible MD already called Patient] to talk to you more about how you are feeling and other people you have had contact with who might now also have the virus.  Also,  [employer] [place] is Partnering with the [facility] for Covid-19 research, you may be contacted directly by research staff.\"    Assessment (Inquire about Patient's current symptoms)   Assessment   Current Symptoms at time of phone call: (if no symptoms, document No symptoms] Sore throat and body aches    Symptoms onset (if applicable) 9/1/2021     If at time of call, Patients symptoms hare worsened, the Patient should contact 911 or have someone drive them to Emergency Dept promptly:      If Patient calling 911, inform 911 personal that you have tested positive for the Coronavirus (COVID-19).  Place mask on and await 911 to arrive.    If Emergency Dept, If possible, please have another adult drive you to the Emergency Dept but you need to wear mask when in contact with other people.      Monoclonal Antibody Administration    You may be eligible to receive a new treatment with a monoclonal antibody for preventing " "hospitalization in patients at high risk for complications from COVID-19.   This medication is still experimental and available on a limited basis; it is given through an IV and must be given at an infusion center. Please note that not all people who are eligible will receive the medication since it is in limited supply.     Are you interested in being considered for this medication?  No.   Does the patient fit the criteria: Patient declined    If patient qualifies based on above criteria:  \"You will be contacted if you are selected to receive this treatment in the next 1-2 business days.   This is time sensitive and if you are not selected in the next 1-2 business days, you will not receive the medication.  If you do not receive a call to schedule, you have not been selected.\"      Review information with Patient    Your result was positive. This means you have COVID-19 (coronavirus).  We have sent you a letter that reviews the information that I'll be reviewing with you now.    How can I protect others?    If you have symptoms: stay home and away from others (self-isolate) until:    You've had no fever--and no medicine that reduces fever--for 1 full day (24 hours). And       Your other symptoms have gotten better. For example, your cough or breathing has improved. And     At least 10 days have passed since your symptoms started. (If you've been told by a doctor that you have a weak immune system, wait 20 days.)     If you don't have symptoms: Stay home and away from others (self-isolate) until at least 10 days have passed since your first positive COVID-19 test. (Date test collected)    During this time:    Stay in your own room, including for meals. Use your own bathroom if you can.    Stay away from others in your home. No hugging, kissing or shaking hands. No visitors.     Don't go to work, school or anywhere else.     Clean  high touch  surfaces often (doorknobs, counters, handles, etc.). Use a household " cleaning spray or wipes. You'll find a full list on the EPA website at www.epa.gov/pesticide-registration/list-n-disinfectants-use-against-sars-cov-2.     Cover your mouth and nose with a mask, tissue or other face covering to avoid spreading germs.    Wash your hands and face often with soap and water.    Make a list of people you have been in close contact with recently, even if either of you wore a face covering.   ; Start your list from 2 days before you became ill or had a positive test.  ; Include anyone that was within 6 feet of you for a cumulative total of 15 minutes or more in 24 hours. (Example: if you sat next to Sravan for 5 minutes in the morning and 10 minutes in the afternoon, then you were in close contact for 15 minutes total that day. Sravan would be added to your list.)    A public health worker will call or text you. It is important that you answer. They will ask you questions about possible exposures to COVID-19, such as people you have been in direct contact with and places you have visited.    Tell the people on your list that you have COVID-19; they should stay away from others for 14 days starting from the last time they were in contact with you (unless you are told something different from a public health worker).     Caregivers in these groups are at risk for severe illness due to COVID-19:  o People 65 years and older  o People who live in a nursing home or long-term care facility  o People with chronic disease (lung, heart, cancer, diabetes, kidney, liver, immunologic)  o People who have a weakened immune system, including those who:  - Are in cancer treatment  - Take medicine that weakens the immune system, such as corticosteroids  - Had a bone marrow or organ transplant  - Have an immune deficiency  - Have poorly controlled HIV or AIDS  - Are obese (body mass index of 40 or higher)  - Smoke regularly    Caregivers should wear gloves while washing dishes, handling laundry and cleaning  bedrooms and bathrooms.    Wash and dry laundry with special caution. Don't shake dirty laundry, and use the warmest water setting you can.    If you have a weakened immune system, ask your doctor about other actions you should take.    For more tips, go to www.cdc.gov/coronavirus/2019-ncov/downloads/10Things.pdf.    You should not go back to work until you meet the guidelines above for ending your home isolation. You don't need to be retested for COVID-19 before going back to work--studies show that you won't spread the virus if it's been at least 10 days since your symptoms started (or 20 days, if you have a weak immune system).    Employers: This document serves as formal notice of your employee's medical guidelines for going back to work. They must meet the above guidelines before going back to work in person.    How can I take care of myself?    1. Get lots of rest. Drink extra fluids (unless a doctor has told you not to).    2. Take Tylenol (acetaminophen) for fever or pain. If you have liver or kidney problems, ask your family doctor if it's okay to take Tylenol.     Take either:     650 mg (two 325 mg pills) every 4 to 6 hours, or     1,000 mg (two 500 mg pills) every 8 hours as needed.     Note: Don't take more than 3,000 mg in one day. Acetaminophen is found in many medicines (both prescribed and over-the-counter medicines). Read all labels to be sure you don't take too much.    For children, check the Tylenol bottle for the right dose (based on their age or weight).    3. If you have other health problems (like cancer, heart failure, an organ transplant or severe kidney disease): Call your specialty clinic if you don't feel better in the next 2 days.    4. Know when to call 911: Emergency warning signs include:    Trouble breathing or shortness of breath    Pain or pressure in the chest that doesn't go away    Feeling confused like you haven't felt before, or not being able to wake up    Bluish-colored  lips or face    5. Sign up for [employer]. We know it's scary to hear that you have COVID-19. We want to track your symptoms to make sure you're okay over the next 2 weeks. Please look for an email from [employer]--this is a free, online program that we'll use to keep in touch. To sign up, follow the link in the email. Learn more at [email or web address].    Where can I get more information?    [facility]: [email or web address]    Coronavirus Basics: www.health.[facility].[place]./diseases/coronavirus/basics.html    What to Do If You're Sick: www.cdc.gov/coronavirus/2019-ncov/about/steps-when-sick.html    Ending Home Isolation: www.cdc.gov/coronavirus/2019-ncov/hcp/disposition-in-home-patients.html     Caring for Someone with COVID-19: www.cdc.gov/coronavirus/2019-ncov/if-you-are-sick/care-for-someone.html     [facility] clinical trials (COVID-19 research studies): clinicalaffairs.[facility]-clinical-trials     A Positive COVID-19 letter will be sent via [employer] or the mail. (Exception, no letters sent to Presurgerical/Preprocedure Patients)    Renetta Vu LPN

## 2021-09-05 ENCOUNTER — HEALTH MAINTENANCE LETTER (OUTPATIENT)
Age: 51
End: 2021-09-05

## 2021-09-15 ENCOUNTER — NURSE TRIAGE (OUTPATIENT)
Dept: NURSING | Facility: CLINIC | Age: 51
End: 2021-09-15

## 2021-09-15 NOTE — TELEPHONE ENCOUNTER
"COVID + Seen in [place]  (9/1) and then [facility] x2 (9/10 & 9/13). He has a cough which began 2 weeks ago and has not slept in days because of the cough. He was given cough suppressant: \"Benzonatate pearls\" and Pulmicort inhaler but it is not helping. He is not eating anything, he is drinking.    She wants to know, does he need to be seen again in order to get something else for the cough ? She wanted to know if he could get cough medication with codeine so that he could sleep ?.     He still has a low grade fever lpctd=552.0. No difficulty breathing.   His chest burns when he coughs, and also not coughing (not new, currently=\"6\"). He coughed up blood this morning x1. He thinks about a teaspoon. The cough is worse at night. It is productive \"non stop phlegm\". Yellowish-clear. It has been green, yesterday brown. Lungs were clear late Sunday night when seen. Friday CXR was done, no call back rec'd telling them it was abnormal.      PCP: ARISTEO Dhillon. No specific PCP.   Dr Cuca Putnam on call, paged via [employer] @ 7:16pm.  Order for narcotic will not be given as a phone order--he would need to be seen (perhaps [facility]). He could try Mucinex DM in addition to the Benzonate. If he coughs up more blood, then he should be seen.   Contacted servandoan other: Tracey with this information.   He has been using Mucinex DM max and Mucinex night shift without relief. He also has a nebulizer (Albuterol) that belonged to his daughter that he tried but it did not help.   They may go to [facility] if they are still open, she will call first. (Dinorah Dhillon).     Audrey Mills RN Triage Nurse Advisor 7:28 PM 9/15/2021    Reason for Disposition    Chest pain  (Exception: MILD central chest pain, present only when coughing)    Additional Information    Negative: Severe difficulty breathing (e.g., struggling for each breath, speaks in single words)    Negative: Bluish (or gray) lips or face now    Negative: [1] " Difficulty breathing AND [2] exposure to flames, smoke, or fumes    Negative: [1] Stridor AND [2] difficulty breathing    Negative: Sounds like a life-threatening emergency to the triager    Negative: [1] Previous asthma attacks AND [2] this feels like asthma attack    Negative: Dry (non-productive) cough (i.e., no sputum or minimal clear sputum)    Protocols used: COUGH - ACUTE [id number]  COVID 19 Nurse Triage Plan/Patient Instructions    Please be aware that novel coronavirus (COVID-19) may be circulating in the community. If you develop symptoms such as fever, cough, or SOB or if you have concerns about the presence of another infection including coronavirus (COVID-19), please contact your health care provider or visit [email or web address].     Disposition/Instructions    In-Person Visit with provider recommended. Reference Visit Selection Guide.    Thank you for taking steps to prevent the spread of this virus.  o Limit your contact with others.  o Wear a simple mask to cover your cough.  o Wash your hands well and often.    Resources    [facility]: About COVID-19: [email or web address]    CDC: What to Do If You're Sick: www.cdc.gov/coronavirus/2019-ncov/about/steps-when-sick.html    CDC: Ending Home Isolation: www.cdc.gov/coronavirus/2019-ncov/hcp/disposition-in-home-patients.html     CDC: Caring for Someone: www.cdc.gov/coronavirus/2019-ncov/if-you-are-sick/care-for-someone.html     [facility]: Interim Guidance for Hospital Discharge to Home: www.health.[facility].[place]/diseases/coronavirus/hcp/hospdischarge.pdf    [facility] clinical trials (COVID-19 research studies): clinicalaffairs.[facility].edu/[facility]-clinical-trials     Below are the COVID-19 hotlines at the [place] Department of Health ([facility]). Interpreters are available.   o For health questions: Call 368-835-3690 or 1-556.632.4341 (7 a.m. to 7 p.m.)  o For questions about schools and childcare: Call 768-909-3848 or  9-529-571-2798 (7 a.m. to 7 p.m.)

## 2022-02-17 PROBLEM — K21.9 GASTROESOPHAGEAL REFLUX DISEASE: Status: ACTIVE | Noted: 2017-12-06

## 2022-10-19 ENCOUNTER — OFFICE VISIT (OUTPATIENT)
Dept: PEDIATRICS | Facility: CLINIC | Age: 52
End: 2022-10-19
Payer: COMMERCIAL

## 2022-10-19 ENCOUNTER — OFFICE VISIT (OUTPATIENT)
Dept: URGENT CARE | Facility: URGENT CARE | Age: 52
End: 2022-10-19
Payer: COMMERCIAL

## 2022-10-19 ENCOUNTER — ANCILLARY PROCEDURE (OUTPATIENT)
Dept: CT IMAGING | Facility: CLINIC | Age: 52
End: 2022-10-19
Attending: EMERGENCY MEDICINE
Payer: COMMERCIAL

## 2022-10-19 VITALS
HEART RATE: 82 BPM | BODY MASS INDEX: 33.43 KG/M2 | TEMPERATURE: 97.1 F | WEIGHT: 253.4 LBS | SYSTOLIC BLOOD PRESSURE: 151 MMHG | OXYGEN SATURATION: 97 % | DIASTOLIC BLOOD PRESSURE: 93 MMHG

## 2022-10-19 VITALS
DIASTOLIC BLOOD PRESSURE: 94 MMHG | RESPIRATION RATE: 18 BRPM | OXYGEN SATURATION: 95 % | TEMPERATURE: 98.2 F | HEART RATE: 79 BPM | SYSTOLIC BLOOD PRESSURE: 142 MMHG

## 2022-10-19 DIAGNOSIS — N18.2 CKD (CHRONIC KIDNEY DISEASE) STAGE 2, GFR 60-89 ML/MIN: ICD-10-CM

## 2022-10-19 DIAGNOSIS — R06.02 SHORTNESS OF BREATH: ICD-10-CM

## 2022-10-19 DIAGNOSIS — U09.9 POST-COVID-19 SYNDROME MANIFESTING AS CHRONIC COUGH: Primary | ICD-10-CM

## 2022-10-19 DIAGNOSIS — U07.1 BRONCHITIS DUE TO COVID-19 VIRUS: Primary | ICD-10-CM

## 2022-10-19 DIAGNOSIS — R04.2 HEMOPTYSIS: ICD-10-CM

## 2022-10-19 DIAGNOSIS — J40 BRONCHITIS DUE TO COVID-19 VIRUS: Primary | ICD-10-CM

## 2022-10-19 DIAGNOSIS — R05.3 POST-COVID-19 SYNDROME MANIFESTING AS CHRONIC COUGH: Primary | ICD-10-CM

## 2022-10-19 DIAGNOSIS — R03.0 ELEVATED BLOOD PRESSURE READING WITHOUT DIAGNOSIS OF HYPERTENSION: ICD-10-CM

## 2022-10-19 PROBLEM — Z72.0 TOBACCO ABUSE DISORDER: Status: RESOLVED | Noted: 2017-12-06 | Resolved: 2022-10-19

## 2022-10-19 LAB
ANION GAP SERPL CALCULATED.3IONS-SCNC: 4 MMOL/L (ref 3–14)
BASOPHILS # BLD AUTO: 0 10E3/UL (ref 0–0.2)
BASOPHILS NFR BLD AUTO: 1 %
BUN SERPL-MCNC: 18 MG/DL (ref 7–30)
CALCIUM SERPL-MCNC: 9.7 MG/DL (ref 8.5–10.1)
CHLORIDE BLD-SCNC: 105 MMOL/L (ref 94–109)
CO2 SERPL-SCNC: 31 MMOL/L (ref 20–32)
CREAT BLD-MCNC: 1.2 MG/DL (ref 0.7–1.3)
CREAT SERPL-MCNC: 1.15 MG/DL (ref 0.66–1.25)
EOSINOPHIL # BLD AUTO: 0.1 10E3/UL (ref 0–0.7)
EOSINOPHIL NFR BLD AUTO: 2 %
ERYTHROCYTE [DISTWIDTH] IN BLOOD BY AUTOMATED COUNT: 13.1 % (ref 10–15)
GFR SERPL CREATININE-BSD FRML MDRD: 76 ML/MIN/1.73M2
GFR SERPL CREATININE-BSD FRML MDRD: >60 ML/MIN/1.73M2
GLUCOSE BLD-MCNC: 86 MG/DL (ref 70–99)
HCT VFR BLD AUTO: 47.5 % (ref 40–53)
HGB BLD-MCNC: 15.7 G/DL (ref 13.3–17.7)
IMM GRANULOCYTES # BLD: 0 10E3/UL
IMM GRANULOCYTES NFR BLD: 1 %
LYMPHOCYTES # BLD AUTO: 2.2 10E3/UL (ref 0.8–5.3)
LYMPHOCYTES NFR BLD AUTO: 38 %
MCH RBC QN AUTO: 28.5 PG (ref 26.5–33)
MCHC RBC AUTO-ENTMCNC: 33.1 G/DL (ref 31.5–36.5)
MCV RBC AUTO: 86 FL (ref 78–100)
MONOCYTES # BLD AUTO: 0.6 10E3/UL (ref 0–1.3)
MONOCYTES NFR BLD AUTO: 11 %
NEUTROPHILS # BLD AUTO: 2.8 10E3/UL (ref 1.6–8.3)
NEUTROPHILS NFR BLD AUTO: 47 %
NRBC # BLD AUTO: 0 10E3/UL
NRBC BLD AUTO-RTO: 0 /100
PLATELET # BLD AUTO: 294 10E3/UL (ref 150–450)
POTASSIUM BLD-SCNC: 4.2 MMOL/L (ref 3.4–5.3)
RBC # BLD AUTO: 5.5 10E6/UL (ref 4.4–5.9)
SODIUM SERPL-SCNC: 140 MMOL/L (ref 133–144)
WBC # BLD AUTO: 5.8 10E3/UL (ref 4–11)

## 2022-10-19 PROCEDURE — 71275 CT ANGIOGRAPHY CHEST: CPT | Mod: GC | Performed by: RADIOLOGY

## 2022-10-19 PROCEDURE — 85025 COMPLETE CBC W/AUTO DIFF WBC: CPT | Performed by: EMERGENCY MEDICINE

## 2022-10-19 PROCEDURE — 36415 COLL VENOUS BLD VENIPUNCTURE: CPT | Performed by: EMERGENCY MEDICINE

## 2022-10-19 PROCEDURE — 99207 REFERRAL TO ACUTE AND DIAGNOSTIC SERVICES: CPT | Performed by: NURSE PRACTITIONER

## 2022-10-19 PROCEDURE — 99215 OFFICE O/P EST HI 40 MIN: CPT | Mod: CS | Performed by: EMERGENCY MEDICINE

## 2022-10-19 PROCEDURE — 82565 ASSAY OF CREATININE: CPT | Performed by: EMERGENCY MEDICINE

## 2022-10-19 PROCEDURE — 80048 BASIC METABOLIC PNL TOTAL CA: CPT

## 2022-10-19 RX ORDER — IOPAMIDOL 755 MG/ML
100 INJECTION, SOLUTION INTRAVASCULAR ONCE
Status: COMPLETED | OUTPATIENT
Start: 2022-10-19 | End: 2022-10-19

## 2022-10-19 RX ORDER — BENZONATATE 100 MG/1
100 CAPSULE ORAL 2 TIMES DAILY PRN
Qty: 25 CAPSULE | Refills: 0 | Status: SHIPPED | OUTPATIENT
Start: 2022-10-19 | End: 2022-12-07

## 2022-10-19 RX ORDER — PREDNISONE 20 MG/1
40 TABLET ORAL DAILY
Qty: 10 TABLET | Refills: 0 | Status: SHIPPED | OUTPATIENT
Start: 2022-10-19 | End: 2022-10-24

## 2022-10-19 RX ADMIN — IOPAMIDOL 80 ML: 755 INJECTION, SOLUTION INTRAVASCULAR at 16:35

## 2022-10-19 ASSESSMENT — PAIN SCALES - GENERAL: PAINLEVEL: NO PAIN (0)

## 2022-10-19 NOTE — PROGRESS NOTES
Chief Complaint   Patient presents with     Cough     Cough for 3 weeks, pt coughed up blood today, pt taken Robitussin, cough drops not helping, sound like a bark, pt is fatigue wears out quickly          ICD-10-CM    1. Post-COVID-19 syndrome manifesting as chronic cough  R05.3 Referral to Acute and Diagnostic Services (Day of diagnostic / First order acute)    U09.9       2. Hemoptysis  R04.2 Referral to Acute and Diagnostic Services (Day of diagnostic / First order acute)      3. Shortness of breath  R06.02 Referral to Acute and Diagnostic Services (Day of diagnostic / First order acute)      Patient needs immediate advanced imaging and further work-up to rule out a PE or other possible cause of hemoptysis.  This may simply be a post COVID cough and fatigue but it also could be a PE.  Spoke with MOIRA Chung at the [facility] diagnostic service [facility] and he will be happy to see the patient.  Patient is directed not to eat or drink but to drive directly to the [facility] for immediate assessment.      Subjective     Erik Guardado is an 51 year old male who presents to clinic today for cough for the last 3 weeks, shortness of breath, fatigue and today coughed up some blood.  He had COVID-19 about 4 to 5 weeks ago and the cough never went away.      ROS: 10 point ROS neg other than the symptoms noted above in the HPI.       Objective    BP (!) 151/93 (BP Location: Right arm, Patient Position: Sitting, Cuff Size: Adult Regular)   Pulse 82   Temp 97.1  F (36.2  C) (Tympanic)   Wt 114.9 kg (253 lb 6.4 oz)   SpO2 97%   BMI 33.43 kg/m    Nurses notes and VS have been reviewed.    Physical Exam       GENERAL APPEARANCE: alert and mild distress     EYES: PERRL, EOMI, sclera non-icteric     HENT: oral exam benign, mucus membranes intact, without ulcers or lesions     NECK: no adenopathy or asymmetry, thyroid normal to palpation     RESP: lungs clear to auscultation - no rales, rhonchi or wheezes     CV: regular  rates and rhythm, no murmurs, rubs, or gallop     ABDOMEN:  soft, nontender, no HSM or masses and bowel sounds normal     MS: extremities normal- no gross deformities noted; normal muscle tone.     SKIN: no suspicious lesions or rashes     NEURO: Normal strength and tone, mentation intact and speech normal     PSYCH: normal thought process; no significant mood disturbance    Patient Instructions   Go directly to the advanced diagnostic service Center in [place] as we discussed.  Do not eat or drink or stop on your way over.      AMANDA Hernandez, CNP  [facility] Provider    The use of Dragon/theScore dictation services may have been used to construct the content in this note; any grammatical or spelling errors are non-intentional. Please contact the author of this note directly if you are in need of any clarification.

## 2022-10-19 NOTE — PROGRESS NOTES
Assessment & Plan     Diagnosis:  (U07.1,  J40) Bronchitis due to COVID-19 virus  (primary encounter diagnosis)  Plan: predniSONE (DELTASONE) 20 MG tablet,         benzonatate (TESSALON) 100 MG capsule    (R04.2) Hemoptysis    (R03.0) Elevated blood pressure reading without diagnosis of hypertension    (N18.2) CKD (chronic kidney disease) stage 2, GFR 60-89 ml/min      Medical Decision Making:  Erik Guardado is a 51 year old male who presents with coughing up blood x1 this morning. Patient has had COVID-19 with coughing fits for the past 2 weeks. Vital signs on arrival are notable for elevated blood pressure; O2 is 95% on room air.    Broad DDx considered including pulmonary source (PE, Pneumonia, bronchitis, mass, avm, etc) vs epistaxis vs oral/esophageal/stomach source.     Plan is home based on small amount of hemoptysis once this morning, not on blood thinners, negative CT PE study and work up in the setting of a stable patient.  Patient BMP does demonstrate mild chronic kidney disease, given his elevated blood pressure and incidental finding of mild pulmonary arterial enlargement we established a primary care visit with the patient to address HTN/CKD and possible pulmonary hypertension.    Patient with acute bronchitis symptoms since having COVID which I suspect is the cause of his one episode of hemoptysis today. There has been no recurrent bleeding with coughing. No hematemesis, melena or epistaxis. CBC shows hemoglobin within normal limits. He has a completely benign abdominal exam here with no history of heavy alcohol or NSAID use.  Patient will be started on medications for acute bronchitis as noted above. Given clear lungs, normal oxygen and no fever I do not feel that antibiotics are indicated at this time.    Patient voices understanding and agreement with the plan including reasons to go to the ER immediately as well as to be seen by a more consistent care-giver, such as their PCP, if the symptoms  "persist more than 2 days.       40 minutes spent on the date of the encounter doing chart review, review of outside records, review of test results, interpretation of tests, patient visit and documentation       Lawrence Duval PA-C  [facility]    Subjective     Erik Guardado is a 51 year old male who presents to clinic today for the following health issues:  Chief Complaint   Patient presents with     Cough     Shortness of Breath       HPI  Patient states that he has continued to have a cough (productive) , SOB on exertion, chest wall discomfort (intermediate). Patient reports that he tested positive 10/3/22.  Denies fevers and other URI SX.     Course of illness is waxing and waning.  Severity: moderate  Current and Associated symptoms: cough - productive and fatigue and notes that he coughed up a \"small blood clot\" mixed with mucous this morning. No nosebleed, sore throat or abdominal pain noted.   Treatment measures tried include OTC Cough med.  Predisposing factors include recent illness: COVID-19 10/3/2022.    Patient denies any abdominal pain, chest pain at rest, heavy NSAID or alcohol use, recurrent coughing up blood episodes, leg swelling, dark or bloody stools or coffee-ground like emesis.     Review of Systems    See HPI    Objective      Vitals: BP (!) 142/94 (BP Location: Right arm, Cuff Size: Adult Regular)   Pulse 79   Temp 98.2  F (36.8  C) (Oral)   Resp 18   SpO2 95%       Patient Vitals for the past 24 hrs:   BP Temp Temp src Pulse Resp SpO2   10/19/22 1708 (!) 142/94 -- -- 79 -- 95 %   10/19/22 1552 (!) 147/86 98.2  F (36.8  C) Oral 83 18 95 %       Vital signs reviewed by: Lawrence Duval PA-C    Physical Exam   Constitutional: Patient is alert and cooperative. No acute distress.  Ears: Right TM is normal. Left TM is normal. External ear canals are normal.  Mouth: Mucous membranes are moist. Normal tongue and tonsil. Posterior oropharynx is clear. No blood visualized in the " posterior oropharynx.  Eyes: Conjunctivae, EOMI and lids are normal. PERRL.  Nose: No excoriations or epistaxis noted. No septal mass/hematoma.  Cardiovascular: Regular rate and rhythm  Pulmonary/Chest: Lungs are clear to auscultation throughout. Effort normal. No respiratory distress. No wheezes, rales or rhonchi.  GI: Abdomen is soft and non-tender throughout.   Neurological: Alert and oriented x3.  MSK: no lower extremity edema bilaterally.  Skin: No rash noted on visualized skin.  Psychiatric:The patient has a normal mood and affect.     Labs/Imaging:  Results for orders placed or performed in visit on 10/19/22   CT Chest Pulmonary Embolism w Contrast     Status: None    Narrative    CT CHEST PULMONARY EMBOLISM W CONTRAST, 10/19/2022 4:36 PM    History: Hemoptysis    Comparison: Chest x-ray 2/23/2014    Technique: Helical acquisition of CT images of the chest from the lung  apices to the kidneys were acquired after the administration of  intravenous contrast according to the CT pulmonary angiogram protocol.  Axial images were reconstructed in 1 and 3 mm slice thickness. Coronal  reconstructions performed. Three-dimensional (3D) post-processed  angiographic images were reconstructed, archived to PACS and used in  the interpretation of this study.    Contrast dose: 80ml Isovue 370    Findings:   The contrast bolus is adequate.  There is no pulmonary embolism. Mild  pulmonary enlargement 3.5 cm. No evidence of infection.  No suspicious  pulmonary nodules.  No pleural effusion or pneumothorax. Bovine aortic  arch with the left common carotid artery originating off the base of  the right brachiocephalic.    Mediastinum: Heart size is normal. No pericardial effusion. Normal  caliber of the great vessels. There is no mediastinal or hilar  lymphadenopathy by size criteria. The esophagus is unremarkable.  Normal appearance of the thyroid gland. Metallic density inferior to  the left ventricle similar since  2/23/2014    Upper abdomen: Hypodense left adrenal nodule superiorly there indicate  small adenoma. Right renal cyst..    Bones and soft tissues: No acute osseous abnormality       Impression    Impression:  1. No pulmonary embolism identified.  2. Mild pulmonary enlargement which may indicate pulmonary artery  hypertension.    I have personally reviewed the examination and initial interpretation  and I agree with the findings.    ZACHARY BARTH MD         SYSTEM ID:  [id number]   Creatinine POCT     Status: Normal   Result Value Ref Range    Creatinine POCT 1.2 0.7 - 1.3 mg/dL    GFR, ESTIMATED POCT >60 >60 mL/min/1.73m2   Results for orders placed or performed in visit on 10/19/22   Basic metabolic panel     Status: Normal   Result Value Ref Range    Sodium 140 133 - 144 mmol/L    Potassium 4.2 3.4 - 5.3 mmol/L    Chloride 105 94 - 109 mmol/L    Carbon Dioxide (CO2) 31 20 - 32 mmol/L    Anion Gap 4 3 - 14 mmol/L    Urea Nitrogen 18 7 - 30 mg/dL    Creatinine 1.15 0.66 - 1.25 mg/dL    Calcium 9.7 8.5 - 10.1 mg/dL    Glucose 86 70 - 99 mg/dL    GFR Estimate 76 >60 mL/min/1.73m2   CBC with platelets and differential     Status: None   Result Value Ref Range    WBC Count 5.8 4.0 - 11.0 10e3/uL    RBC Count 5.50 4.40 - 5.90 10e6/uL    Hemoglobin 15.7 13.3 - 17.7 g/dL    Hematocrit 47.5 40.0 - 53.0 %    MCV 86 78 - 100 fL    MCH 28.5 26.5 - 33.0 pg    MCHC 33.1 31.5 - 36.5 g/dL    RDW 13.1 10.0 - 15.0 %    Platelet Count 294 150 - 450 10e3/uL    % Neutrophils 47 %    % Lymphocytes 38 %    % Monocytes 11 %    % Eosinophils 2 %    % Basophils 1 %    % Immature Granulocytes 1 %    NRBCs per 100 WBC 0 <1 /100    Absolute Neutrophils 2.8 1.6 - 8.3 10e3/uL    Absolute Lymphocytes 2.2 0.8 - 5.3 10e3/uL    Absolute Monocytes 0.6 0.0 - 1.3 10e3/uL    Absolute Eosinophils 0.1 0.0 - 0.7 10e3/uL    Absolute Basophils 0.0 0.0 - 0.2 10e3/uL    Absolute Immature Granulocytes 0.0 <=0.4 10e3/uL    Absolute NRBCs 0.0 10e3/uL   CBC  with platelets differential     Status: None    Narrative    The following orders were created for panel order CBC with platelets differential.  Procedure                               Abnormality         Status                     ---------                               -----------         ------                     CBC with platelets and d...[[id number]]                      Final result                 Please view results for these tests on the individual orders.       Reading per radiology      Lawrence Duval PA-C, October 19, 2022

## 2022-10-19 NOTE — PROGRESS NOTES
{PROVIDER CHARTING PREFERENCE:407210}    Daisy Valencia is a 51 year oldpresenting for the following health issues:  Cough and Shortness of Breath      HPI Patient states that he has continued to have a cough (productive) , SOB on exertion, chest wall discomfort (intermediate). Patient reports that he tested positive 10/3/22.  Denies fevers and URI SX.     {SUPERLIST (Optional):289213}  {additonal problems for provider to add (Optional):904729}    Review of Systems   {ROS COMP (Optional):499298}      Objective    There were no vitals taken for this visit.  There is no height or weight on file to calculate BMI.  Physical Exam   {Exam List (Optional):521238}    {Diagnostic Test Results (Optional):086765}    {AMBULATORY ATTESTATION (Optional):922067}

## 2022-10-19 NOTE — PATIENT INSTRUCTIONS
Go directly to the advanced diagnostic service Center in [place] as we discussed.  Do not eat or drink or stop on your way over.

## 2022-10-29 ENCOUNTER — HEALTH MAINTENANCE LETTER (OUTPATIENT)
Age: 52
End: 2022-10-29

## 2022-11-10 ENCOUNTER — OFFICE VISIT (OUTPATIENT)
Dept: FAMILY MEDICINE | Facility: CLINIC | Age: 52
End: 2022-11-10
Payer: COMMERCIAL

## 2022-11-10 VITALS
HEART RATE: 81 BPM | TEMPERATURE: 97.6 F | BODY MASS INDEX: 33.43 KG/M2 | OXYGEN SATURATION: 97 % | DIASTOLIC BLOOD PRESSURE: 81 MMHG | RESPIRATION RATE: 17 BRPM | SYSTOLIC BLOOD PRESSURE: 143 MMHG | HEIGHT: 73 IN

## 2022-11-10 DIAGNOSIS — Z86.16 POST-COVID SYNDROME RESOLVED: Primary | ICD-10-CM

## 2022-11-10 DIAGNOSIS — Z12.11 SCREEN FOR COLON CANCER: ICD-10-CM

## 2022-11-10 DIAGNOSIS — R03.0 ELEVATED BP WITHOUT DIAGNOSIS OF HYPERTENSION: ICD-10-CM

## 2022-11-10 DIAGNOSIS — Z01.00 VISIT FOR EYE AND VISION EXAM: ICD-10-CM

## 2022-11-10 DIAGNOSIS — E78.5 DYSLIPIDEMIA: ICD-10-CM

## 2022-11-10 DIAGNOSIS — Z11.59 NEED FOR HEPATITIS C SCREENING TEST: ICD-10-CM

## 2022-11-10 PROBLEM — R06.83 SNORING: Status: ACTIVE | Noted: 2022-03-14

## 2022-11-10 LAB
ALT SERPL W P-5'-P-CCNC: 35 U/L (ref 0–70)
AST SERPL W P-5'-P-CCNC: 28 U/L (ref 0–45)
CHOLEST SERPL-MCNC: 209 MG/DL
FASTING STATUS PATIENT QL REPORTED: YES
HCV AB SERPL QL IA: NONREACTIVE
HDLC SERPL-MCNC: 54 MG/DL
LDLC SERPL CALC-MCNC: 138 MG/DL
NONHDLC SERPL-MCNC: 155 MG/DL
TRIGL SERPL-MCNC: 84 MG/DL

## 2022-11-10 PROCEDURE — 84460 ALANINE AMINO (ALT) (SGPT): CPT | Performed by: NURSE PRACTITIONER

## 2022-11-10 PROCEDURE — 36415 COLL VENOUS BLD VENIPUNCTURE: CPT | Performed by: NURSE PRACTITIONER

## 2022-11-10 PROCEDURE — 80061 LIPID PANEL: CPT | Performed by: NURSE PRACTITIONER

## 2022-11-10 PROCEDURE — 99214 OFFICE O/P EST MOD 30 MIN: CPT | Performed by: NURSE PRACTITIONER

## 2022-11-10 PROCEDURE — 86803 HEPATITIS C AB TEST: CPT | Performed by: NURSE PRACTITIONER

## 2022-11-10 PROCEDURE — 84450 TRANSFERASE (AST) (SGOT): CPT | Performed by: NURSE PRACTITIONER

## 2022-11-10 ASSESSMENT — PAIN SCALES - GENERAL: PAINLEVEL: NO PAIN (0)

## 2022-11-30 ENCOUNTER — TELEPHONE (OUTPATIENT)
Dept: GASTROENTEROLOGY | Facility: CLINIC | Age: 52
End: 2022-11-30

## 2022-11-30 NOTE — TELEPHONE ENCOUNTER
Screening Questions  BLUE  KIND OF PREP RED  LOCATION [review exclusion criteria] GREEN  SEDATION TYPE        y Are you active on mychart?       Renetta Denny APRN CNP Ordering/Referring Provider?        [facility] What type of coverage do you have?      n Have you had a positive covid test in the last 14 days?     33.4 1. BMI  [BMI 40+ - review exclusion criteria]    y  2. Are you able to give consent for your medical care? [IF NO,RN REVIEW]        n  3. Are you taking any prescription pain medications on a routine schedule?      n  3a. EXTENDED PREP What kind of prescription?     n 4. Do you have any chemical dependencies such as alcohol, street drugs, or methadone?    n 5. Do you have any history of post-traumatic stress syndrome, severe anxiety or history of psychosis?      **If yes 3- 5 , please schedule with MAC sedation.**          IF YES TO ANY 6 - 10 - HOSPITAL SETTING ONLY.     n 6.   Do you need assistance transferring?     n 7.   Have you had a heart or lung transplant?    n 8.   Are you currently on dialysis?   n 9.   Do you use daily home oxygen?   n 10. Do you take nitroglycerin?   10a. n If yes, how often?     11. [FEMALES]  n Are you currently pregnant?    11a. n If yes, how many weeks? [ Greater than 12 weeks, OR NEEDED]    n 12. Do you have Pulmonary Hypertension? *NEED PAC APPT AT [facility]*     n 13. [review exclusion criteria]  Do you have any implantable devices in your body (pacemaker, defib, LVAD)?    n 14. In the past 6 months, have you had any heart related issues including cardiomyopathy or heart attack?     14a. n If yes, did it require cardiac stenting if so when?     n 15. Have you had a stroke or Transient ischemic attack (TIA - aka  mini stroke ) within 6 months?      n 16. Do you have mod to severe Obstructive Sleep Apnea?  [Hospital only - Ok at [place]]    n 17. Do you have SEVERE AND UNCONTROLLED asthma? *NEED PAC APPT AT [facility]*     n 18. Are you currently taking any  "blood thinners?     18a. If yes, inform patient to \"follow up w/ ordering provider for bridging instructions.\"    n 19. Do you take the medication Phentermine?    19a. If yes, \"Hold for 7 days before procedure.  Please consult your prescribing provider if you have questions about holding this medication.\"     n  20. Do you have chronic kidney disease?      n  21. Do you have a diagnosis of diabetes?     n  22. On a regular basis do you go 3-5 days between bowel movements?      23. Preferred LOCAL Pharmacy for Pre Prescription    [ LIST ONLY ONE PHARMACY]        [id number] - [facility] [address]        - CLOSING REMINDERS -    Informed patient they will need an adult    Cannot take any type of public or medical transportation alone    Conscious Sedation- Needs  for 6 hours after the procedure       MAC/General-Needs  for 24 hours after procedure    Pre-Procedure Covid test to be completed [[facility] PCR Testing Required]    Confirmed Nurse will call to complete assessment       - SCHEDULING DETAILS -     dangelo  Surgeon    02/01/23  Date of Procedure  Lower Endoscopy [Colonoscopy]  Type of Procedure Scheduled  [facility] [place]   [facility]-If you answer yes to questions #8, #20, #21Which Colonoscopy Prep was Sent?     MOD Sedation Type     n PAC / Pre-op Required         Additional comments:            "

## 2022-12-07 ENCOUNTER — OFFICE VISIT (OUTPATIENT)
Dept: OPTOMETRY | Facility: CLINIC | Age: 52
End: 2022-12-07
Attending: NURSE PRACTITIONER
Payer: COMMERCIAL

## 2022-12-07 DIAGNOSIS — H40.003 GLAUCOMA SUSPECT, BILATERAL: ICD-10-CM

## 2022-12-07 DIAGNOSIS — Z01.00 VISIT FOR EYE AND VISION EXAM: ICD-10-CM

## 2022-12-07 DIAGNOSIS — H52.4 PRESBYOPIA: ICD-10-CM

## 2022-12-07 DIAGNOSIS — H52.223 REGULAR ASTIGMATISM OF BOTH EYES: ICD-10-CM

## 2022-12-07 DIAGNOSIS — H40.003 GLAUCOMA SUSPECT, BILATERAL: Primary | ICD-10-CM

## 2022-12-07 PROCEDURE — 92015 DETERMINE REFRACTIVE STATE: CPT | Performed by: OPTOMETRIST

## 2022-12-07 PROCEDURE — 92250 FUNDUS PHOTOGRAPHY W/I&R: CPT | Performed by: OPTOMETRIST

## 2022-12-07 PROCEDURE — 92004 COMPRE OPH EXAM NEW PT 1/>: CPT | Performed by: OPTOMETRIST

## 2022-12-07 ASSESSMENT — CONF VISUAL FIELD
OD_NORMAL: 1
OS_SUPERIOR_TEMPORAL_RESTRICTION: 0
OD_INFERIOR_TEMPORAL_RESTRICTION: 0
OD_SUPERIOR_TEMPORAL_RESTRICTION: 0
OS_INFERIOR_NASAL_RESTRICTION: 0
OD_INFERIOR_NASAL_RESTRICTION: 0
OS_SUPERIOR_NASAL_RESTRICTION: 0
OS_NORMAL: 1
OD_SUPERIOR_NASAL_RESTRICTION: 0
OS_INFERIOR_TEMPORAL_RESTRICTION: 0

## 2022-12-07 ASSESSMENT — REFRACTION_MANIFEST
OS_SPHERE: -1.00
OS_AXIS: 115
OS_AXIS: 116
OD_SPHERE: -0.25
OD_AXIS: 050
METHOD_AUTOREFRACTION: 1
OD_ADD: +2.50
OD_SPHERE: -0.25
OS_CYLINDER: +0.50
OD_CYLINDER: +0.25
OD_AXIS: 050
OD_CYLINDER: +0.25
OS_SPHERE: -1.00
OS_ADD: +2.50
OS_CYLINDER: +0.50

## 2022-12-07 ASSESSMENT — VISUAL ACUITY
METHOD: SNELLEN - LINEAR
CORRECTION_TYPE: GLASSES
OD_CC: 20/20
OS_SC+: -2
OS_CC: 20/20
OD_SC+: -2
OS_SC: 20/25
OD_SC: 20/20

## 2022-12-07 ASSESSMENT — KERATOMETRY
OD_AXISANGLE2_DEGREES: 160
OS_AXISANGLE2_DEGREES: 5
OD_AXISANGLE_DEGREES: 70
OD_K2POWER_DIOPTERS: 41.50
OS_AXISANGLE_DEGREES: 95
OS_K2POWER_DIOPTERS: 41.75
OD_K1POWER_DIOPTERS: 41.25
OS_K1POWER_DIOPTERS: 41.25

## 2022-12-07 ASSESSMENT — REFRACTION_WEARINGRX
OS_SPHERE: +1.00
OD_CYLINDER: SPHERE
OS_AXIS: 130
SPECS_TYPE: SVL
OS_CYLINDER: +0.25
OD_SPHERE: +1.50

## 2022-12-07 ASSESSMENT — CUP TO DISC RATIO
OD_RATIO: 0.5
OS_RATIO: 0.5

## 2022-12-07 ASSESSMENT — EXTERNAL EXAM - RIGHT EYE: OD_EXAM: NORMAL

## 2022-12-07 ASSESSMENT — EXTERNAL EXAM - LEFT EYE: OS_EXAM: NORMAL

## 2022-12-07 ASSESSMENT — SLIT LAMP EXAM - LIDS
COMMENTS: NORMAL
COMMENTS: NORMAL

## 2022-12-07 ASSESSMENT — TONOMETRY
IOP_METHOD: APPLANATION
OS_IOP_MMHG: 16
OD_IOP_MMHG: 16

## 2022-12-07 NOTE — LETTER
12/7/2022         RE: Erik Guardado  [address]  [place] [address]        Dear Colleague,    Thank you for referring your patient, Erik Guardado, to the [facility]. Please see a copy of my visit note below.    Chief Complaint   Patient presents with     Annual Eye Exam         Last Eye Exam: 2019  Dilated Previously: Yes, side effects of dilation explained today    What are you currently using to see?  glasses       Distance Vision Acuity: Noticed gradual change in both eyes    Near Vision Acuity: Satisfied with vision while reading and using computer with glasses and unaided    Eye Comfort: good  Do you use eye drops? 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       OBJECTIVE: See Ophthalmology exam    ASSESSMENT:    ICD-10-CM    1. Glaucoma suspect, bilateral - Both Eyes  H40.003 REFRACTION     EYE EXAM (SIMPLE-NONBILLABLE)     Adult Eye  Referral      2. Visit for eye and vision exam - Both Eyes  Z01.00 Adult Eye  Referral     REFRACTION     EYE EXAM (SIMPLE-NONBILLABLE)      3. Presbyopia - Both Eyes  H52.4 REFRACTION     EYE EXAM (SIMPLE-NONBILLABLE)      4. Regular astigmatism of both eyes - Both Eyes  H52.223 REFRACTION     EYE EXAM (SIMPLE-NONBILLABLE)      5. Glaucoma suspect, bilateral  H40.003 Fundus Photos OU (both eyes)          PLAN:     Patient Instructions   You are a glaucoma suspect.  Referral for glaucoma testing.  Referral to [facility] ophthalmology- [phone].    Myopia is a result of long eyes. It is commonly referred to as near-sightedness. Seeing clearly in the distance is the main challenge.    Presbyopia is the diagnosis. Presbyopia is an age-related condition where the eye's crystalline lens doesn't change shape as easily as it once did.    Eyeglass prescription given.    The affects of  the dilating drops last for 4- 6 hours.  You will be more sensitive to light and vision will be blurry up close.  Do not drive if you do not feel comfortable.  Mydriatic sunglasses were given if needed.    Adenike Loving O.D.  [address] [address]    [phone]             Again, thank you for allowing me to participate in the care of your patient.        Sincerely,        Adenike Loving OD

## 2022-12-07 NOTE — PATIENT INSTRUCTIONS
You are a glaucoma suspect.  Referral for glaucoma testing.  Referral to [facility] ophthalmology- [phone].    Myopia is a result of long eyes. It is commonly referred to as near-sightedness. Seeing clearly in the distance is the main challenge.    Presbyopia is the diagnosis. Presbyopia is an age-related condition where the eye's crystalline lens doesn't change shape as easily as it once did.    Eyeglass prescription given.    The affects of the dilating drops last for 4- 6 hours.  You will be more sensitive to light and vision will be blurry up close.  Do not drive if you do not feel comfortable.  Mydriatic sunglasses were given if needed.    Adenike Loving O.D.  [address] [address] [phone]

## 2022-12-07 NOTE — PROGRESS NOTES
Chief Complaint   Patient presents with     Annual Eye Exam         Last Eye Exam: 2019  Dilated Previously: Yes, side effects of dilation explained today    What are you currently using to see?  glasses       Distance Vision Acuity: Noticed gradual change in both eyes    Near Vision Acuity: Satisfied with vision while reading and using computer with glasses and unaided    Eye Comfort: good  Do you use eye drops? 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       OBJECTIVE: See Ophthalmology exam    ASSESSMENT:    ICD-10-CM    1. Glaucoma suspect, bilateral - Both Eyes  H40.003 REFRACTION     EYE EXAM (SIMPLE-NONBILLABLE)     Adult Eye  Referral      2. Visit for eye and vision exam - Both Eyes  Z01.00 Adult Eye  Referral     REFRACTION     EYE EXAM (SIMPLE-NONBILLABLE)      3. Presbyopia - Both Eyes  H52.4 REFRACTION     EYE EXAM (SIMPLE-NONBILLABLE)      4. Regular astigmatism of both eyes - Both Eyes  H52.223 REFRACTION     EYE EXAM (SIMPLE-NONBILLABLE)      5. Glaucoma suspect, bilateral  H40.003 Fundus Photos OU (both eyes)          PLAN:     Patient Instructions   You are a glaucoma suspect.  Referral for glaucoma testing.  Referral to [facility] ophthalmology- [phone].    Myopia is a result of long eyes. It is commonly referred to as near-sightedness. Seeing clearly in the distance is the main challenge.    Presbyopia is the diagnosis. Presbyopia is an age-related condition where the eye's crystalline lens doesn't change shape as easily as it once did.    Eyeglass prescription given.    The affects of the dilating drops last for 4- 6 hours.  You will be more sensitive to light and vision will be blurry up close.  Do not drive if you do not feel comfortable.  Mydriatic sunglasses were given if needed.    [facility]  [place] [address] [place] [address]    [phone]

## 2022-12-14 ENCOUNTER — TELEPHONE (OUTPATIENT)
Dept: OPTOMETRY | Facility: CLINIC | Age: 52
End: 2022-12-14

## 2022-12-28 ENCOUNTER — OFFICE VISIT (OUTPATIENT)
Dept: URGENT CARE | Facility: URGENT CARE | Age: 52
End: 2022-12-28
Payer: COMMERCIAL

## 2022-12-28 ENCOUNTER — NURSE TRIAGE (OUTPATIENT)
Dept: NURSING | Facility: CLINIC | Age: 52
End: 2022-12-28

## 2022-12-28 VITALS
DIASTOLIC BLOOD PRESSURE: 93 MMHG | SYSTOLIC BLOOD PRESSURE: 168 MMHG | BODY MASS INDEX: 34.57 KG/M2 | OXYGEN SATURATION: 97 % | TEMPERATURE: 97.6 F | WEIGHT: 262 LBS | HEART RATE: 82 BPM

## 2022-12-28 DIAGNOSIS — R22.0 SCALP MASS: ICD-10-CM

## 2022-12-28 DIAGNOSIS — M54.50 ACUTE RIGHT-SIDED LOW BACK PAIN WITHOUT SCIATICA: ICD-10-CM

## 2022-12-28 DIAGNOSIS — M62.830 BACK MUSCLE SPASM: Primary | ICD-10-CM

## 2022-12-28 DIAGNOSIS — R03.0 ELEVATED BLOOD PRESSURE READING WITHOUT DIAGNOSIS OF HYPERTENSION: ICD-10-CM

## 2022-12-28 PROCEDURE — 20552 NJX 1/MLT TRIGGER POINT 1/2: CPT | Performed by: PHYSICIAN ASSISTANT

## 2022-12-28 PROCEDURE — 99213 OFFICE O/P EST LOW 20 MIN: CPT | Mod: 25 | Performed by: PHYSICIAN ASSISTANT

## 2022-12-28 RX ORDER — TRIAMCINOLONE ACETONIDE 40 MG/ML
40 INJECTION, SUSPENSION INTRA-ARTICULAR; INTRAMUSCULAR ONCE
Status: COMPLETED | OUTPATIENT
Start: 2022-12-28 | End: 2022-12-28

## 2022-12-28 RX ORDER — CYCLOBENZAPRINE HCL 10 MG
10 TABLET ORAL 3 TIMES DAILY PRN
Qty: 30 TABLET | Refills: 0 | Status: SHIPPED | OUTPATIENT
Start: 2022-12-28

## 2022-12-28 RX ADMIN — TRIAMCINOLONE ACETONIDE 40 MG: 40 INJECTION, SUSPENSION INTRA-ARTICULAR; INTRAMUSCULAR at 19:13

## 2022-12-28 ASSESSMENT — ENCOUNTER SYMPTOMS
WHEEZING: 0
RESPIRATORY NEGATIVE: 1
HEADACHES: 0
BACK PAIN: 1
VOMITING: 0
GASTROINTESTINAL NEGATIVE: 1
HEMATURIA: 0
DYSURIA: 0
CHILLS: 0
CARDIOVASCULAR NEGATIVE: 1
PALPITATIONS: 0
NAUSEA: 0
COUGH: 0
FREQUENCY: 0
FEVER: 0
ALLERGIC/IMMUNOLOGIC NEGATIVE: 1
SORE THROAT: 0
CHEST TIGHTNESS: 0
CONSTITUTIONAL NEGATIVE: 1
MYALGIAS: 0
NEUROLOGICAL NEGATIVE: 1
DIARRHEA: 0
ABDOMINAL PAIN: 0
SHORTNESS OF BREATH: 0

## 2022-12-28 NOTE — TELEPHONE ENCOUNTER
Nurse Triage SBAR    Is this a 2nd Level Triage? NO    Situation: Patient calling with severe pain in right lower back/hip area for the last 3 days.  Consent: not needed    Background: Pain on lower right back/hip area for the last 3 days. New pain - has not had before.    Assessment:   Pain - rates 7/10 with movement   Needs to bend over to walk  No abdominal pain  No chest pain  Periodic numbness in left leg - none presently  No fever  No nausea or vomiting    Protocol Recommended Disposition:   See in office today    Recommendation: Advised patient to Go to urgent care . Care advice given. Patient verbalized understanding and agreed with plan.     Liz Rolle RN [facility] 12/28/2022 2:14 PM    Reason for Disposition    MODERATE pain (e.g., interferes with normal activities or awakens from sleep)    SEVERE back pain (e.g., excruciating, unable to do any normal activities) and not improved after pain medicine and CARE ADVICE    Additional Information    Negative: Passed out (i.e., fainted, collapsed and was not responding)    Negative: Shock suspected (e.g., cold/pale/clammy skin, too weak to stand, low BP, rapid pulse)    Negative: Sounds like a life-threatening emergency to the triager    Negative: Major injury to the back (e.g., MVA, fall > 10 feet or 3 meters, penetrating injury, etc.)    Negative: Pain in the upper back over the ribs (rib cage) that radiates (travels) into the chest    Negative: Pain in the upper back over the ribs (rib cage) and worsened by coughing (or clearly increases with breathing)    Negative: Back pain during pregnancy    Negative: SEVERE back pain of sudden onset and age > 60 years    Negative: SEVERE abdominal pain (e.g., excruciating)    Negative: Abdominal pain and age > 60 years    Negative: Unable to urinate (or only a few drops) and bladder feels very full    Negative: Loss of bladder or bowel control (urine or bowel incontinence; wetting self, leaking stool)  of new-onset    Negative: Numbness (loss of sensation) in groin or rectal area    Negative: Passed out (i.e., lost consciousness, collapsed and was not responding)    Negative: Shock suspected (e.g., cold/pale/clammy skin, too weak to stand, low BP, rapid pulse)    Negative: Sounds like a life-threatening emergency to the triager    Negative: Followed a major injury to the back (e.g., MVA, fall > 10 feet or 3 meters, penetrating injury, etc.)    Negative: Upper, mid or lower back pain that occurs mainly in the midline    Negative: SEVERE pain (e.g., excruciating, scale 8-10) and present > 1 hour    Negative: Sudden onset of severe flank pain and age > 60 years    Negative: Abdominal pain and age > 60 years    Negative: Unable to urinate (or only a few drops) > 4 hours and bladder feels very full (e.g., palpable bladder or strong urge to urinate)    Negative: Pain radiates into groin, scrotum    Negative: Blood in urine (red, pink, or tea-colored)    Negative: Vomiting    Negative: Weakness of a leg or foot (e.g., unable to bear weight, dragging foot)    Negative: Patient sounds very sick or weak to the triager    Negative: Fever > 100.4 F (38.0 C)    Negative: Pain or burning with passing urine (urination)    Negative: Pain radiates into groin, scrotum    Negative: Blood in urine (red, pink, or tea-colored)    Negative: Vomiting and pain over lower ribs of back (i.e., flank - kidney area)    Negative: Weakness of a leg or foot (e.g., unable to bear weight, dragging foot)    Negative: Patient sounds very sick or weak to the triager    Negative: Fever > 100.4 F (38.0 C) and flank pain    Negative: Pain or burning with passing urine (urination)    Protocols used: FLANK PAIN-A-OH, BACK PAIN-A-OH

## 2022-12-29 NOTE — PROGRESS NOTES
Chief Complaint:     Chief Complaint   Patient presents with     Flank Pain     Right side pain- No known injury.  Onset on Friday 12/23/22       Medical Decision Making:    Differential Diagnosis:  Back Pain: myofascial low back strain, lumbosacral strain, degenerative disc disease and possible herniated disc      Kinjal Valencia was seen today for flank pain.    Diagnoses and all orders for this visit:    Back muscle spasm  -     INJECTION SNGL/MULT TRIGGER POINT, 1 OR 2 MUSCLES  -     triamcinolone (KENALOG-40) injection 40 mg  -     cyclobenzaprine (FLEXERIL) 10 MG tablet; Take 1 tablet (10 mg) by mouth 3 times daily as needed for muscle spasms    Acute right-sided low back pain without sciatica    Scalp mass  -     Adult Dermatology Referral; Future    Elevated blood pressure reading without diagnosis of hypertension         Plan      Trigger Point Injection    After informed consent was obtained including risks, benefits, and alternatives, I did perform a trigger point injection of the patient's right lumbar region.  After I was able to demarcate the painful area, I cleansed the skin with alcohol. I then used sterile technique and a 27-gauge 1.5-inch needle to infuse 40 mg of Kenalog along with 4 mL of 1% plain lidocaine using sterile technique. Patient tolerated this well with no complications. Patient verbalized significant relief of symptoms.    Ice the area.  Ibuprofen for pain.  Stretching exercises.  Chiropractic may help.  Rx for Flexeril for muscle spasm.  Worrisome symptoms discussed with instructions to go to the ED.  Patient is hypertensive in clinic today.  Second BP reading was also above goal at 168/93.  Patient is currently not taking medication for HTN.  Patient instructed to follow up with PCP in the next week for BP recheck.  Sooner if symptoms worsen.  Order placed for follow up with dermatology for mass on R side of forehead.    Patient verbalized understanding and agreed with this  plan.         HPI: Erik Guardado52 year old male presents with a 3 day history of back pain.  Patient woke up with the pain.  Since then it has unchanged. The patient does not have a past history of back problems.  The pain is localized to right lumbar region.      There is no history of disturbance of bowel or bladder dysfunction associated with this present problem.      There is no associated sciatica in the legs. There is no paresthesia in the legs. The patient does not have a history of weakness in the legs.    Patient also mentions mass on the R forehead that he has had for some time now.  It is not painful, but has very slowly gotten larger.      ROS:      Review of Systems   Constitutional: Negative.  Negative for chills and fever.   HENT: Negative.  Negative for sore throat.    Respiratory: Negative.  Negative for cough, chest tightness, shortness of breath and wheezing.    Cardiovascular: Negative.  Negative for chest pain and palpitations.   Gastrointestinal: Negative.  Negative for abdominal pain, diarrhea, nausea and vomiting.   Genitourinary: Negative for dysuria, frequency, hematuria and urgency.   Musculoskeletal: Positive for back pain. Negative for myalgias.   Skin: Negative for rash.   Allergic/Immunologic: Negative.  Negative for immunocompromised state.   Neurological: Negative.  Negative for headaches.        Family History   Family History   Problem Relation Age of Onset     Unknown/Adopted Father      Cancer No family hx of      Diabetes No family hx of      Cerebrovascular Disease No family hx of      Thyroid Disease No family hx of      Glaucoma No family hx of      Macular Degeneration No family hx of      Hypertension Maternal Grandmother         Problem history  Patient Active Problem List   Diagnosis     CARDIOVASCULAR SCREENING; LDL GOAL LESS THAN 160     Obesity, Class I, BMI 30-34.9     Gastroesophageal reflux disease, esophagitis presence not specified     Urinary urgency      Dyslipidemia     Elevated BP without diagnosis of hypertension     Snoring        Allergies  No Known Allergies     Social History  Social History     Socioeconomic History     Marital status: Single     Spouse name: Not on file     Number of children: Not on file     Years of education: Not on file     Highest education level: Not on file   Occupational History     Not on file   Tobacco Use     Smoking status: Former     Packs/day: 0.50     Types: Cigarettes     Smokeless tobacco: Never   Vaping Use     Vaping Use: Never used   Substance and Sexual Activity     Alcohol use: No     Drug use: No     Sexual activity: Yes     Partners: Female   Other Topics Concern     Parent/sibling w/ CABG, MI or angioplasty before 65F 55M? No   Social History Narrative     Not on file     Social Determinants of Health     Financial Resource Strain: Not on file   Food Insecurity: Not on file   Transportation Needs: Not on file   Physical Activity: Not on file   Stress: Not on file   Social Connections: Not on file   Intimate Partner Violence: Not on file   Housing Stability: Not on file        Current Meds    Current Outpatient Medications:      cyclobenzaprine (FLEXERIL) 10 MG tablet, Take 1 tablet (10 mg) by mouth 3 times daily as needed for muscle spasms, Disp: 30 tablet, Rfl: 0  No current facility-administered medications for this visit.        Physical Exam:     Vital signs reviewed by Allen Flores PA-C  BP (!) 168/93   Pulse 82   Temp 97.6  F (36.4  C) (Tympanic)   Wt 118.8 kg (262 lb)   SpO2 97%   BMI 34.57 kg/m       PEFR:    Physical Exam  Vitals and nursing note reviewed.   Constitutional:       General: He is not in acute distress.     Appearance: He is well-developed. He is not ill-appearing, toxic-appearing or diaphoretic.   HENT:      Head: Normocephalic and atraumatic.        Comments: Roughly 2 cm in diameter firm moveable mass on R forehead.  No pain with palpation.       Right Ear: Hearing, tympanic  membrane, ear canal and external ear normal. Tympanic membrane is not perforated, erythematous, retracted or bulging.      Left Ear: Hearing, tympanic membrane, ear canal and external ear normal. Tympanic membrane is not perforated, erythematous, retracted or bulging.      Nose: Nose normal. No mucosal edema, congestion or rhinorrhea.      Mouth/Throat:      Pharynx: No oropharyngeal exudate or posterior oropharyngeal erythema.      Tonsils: No tonsillar exudate or tonsillar abscesses. 0 on the right. 0 on the left.   Eyes:      Pupils: Pupils are equal, round, and reactive to light.   Cardiovascular:      Rate and Rhythm: Normal rate and regular rhythm.      Heart sounds: Normal heart sounds, S1 normal and S2 normal. Heart sounds not distant. No murmur heard.    No friction rub. No gallop.   Pulmonary:      Effort: Pulmonary effort is normal. No respiratory distress.      Breath sounds: Normal breath sounds. No decreased breath sounds, wheezing, rhonchi or rales.   Abdominal:      General: Bowel sounds are normal. There is no distension.      Palpations: Abdomen is soft.      Tenderness: There is no abdominal tenderness.   Musculoskeletal:      Cervical back: Normal range of motion and neck supple.      Lumbar back: Spasms and tenderness present. No swelling, edema, deformity, signs of trauma or bony tenderness. Normal range of motion. Negative right straight leg raise test and negative left straight leg raise test.        Back:    Lymphadenopathy:      Cervical: No cervical adenopathy.   Skin:     General: Skin is warm and dry.      Findings: No rash.   Neurological:      Mental Status: He is alert.      Cranial Nerves: No cranial nerve deficit.      Sensory: Sensation is intact.      Motor: Motor function is intact. No weakness, atrophy or abnormal muscle tone.      Coordination: Coordination is intact. Coordination normal.      Gait: Gait is intact. Gait normal.      Deep Tendon Reflexes:      Reflex Scores:        Patellar reflexes are 2+ on the right side and 2+ on the left side.       Achilles reflexes are 2+ on the right side and 2+ on the left side.  Psychiatric:         Attention and Perception: He is attentive.         Speech: Speech normal.         Behavior: Behavior normal. Behavior is cooperative.         Thought Content: Thought content normal.         Judgment: Judgment normal.               Allen Flores PA-C  12/28/2022, 6:54 PM

## 2023-01-02 ENCOUNTER — OFFICE VISIT (OUTPATIENT)
Dept: OPHTHALMOLOGY | Facility: CLINIC | Age: 53
End: 2023-01-02
Payer: COMMERCIAL

## 2023-01-02 DIAGNOSIS — H40.003 GLAUCOMA SUSPECT, BOTH EYES: Primary | ICD-10-CM

## 2023-01-02 PROCEDURE — 92083 EXTENDED VISUAL FIELD XM: CPT | Performed by: OPHTHALMOLOGY

## 2023-01-02 PROCEDURE — 92133 CPTRZD OPH DX IMG PST SGM ON: CPT | Performed by: OPHTHALMOLOGY

## 2023-01-02 PROCEDURE — 92002 INTRM OPH EXAM NEW PATIENT: CPT | Performed by: OPHTHALMOLOGY

## 2023-01-02 ASSESSMENT — PACHYMETRY
OS_CT(UM): 562
OD_CT(UM): 548

## 2023-01-02 ASSESSMENT — TONOMETRY
IOP_METHOD: APPLANATION
OD_IOP_MMHG: 18
OS_IOP_MMHG: 19

## 2023-01-02 ASSESSMENT — VISUAL ACUITY
OD_CC: 20/20
CORRECTION_TYPE: GLASSES
METHOD: SNELLEN - LINEAR
OS_CC: 20/20
OS_CC+: -1

## 2023-01-02 ASSESSMENT — SLIT LAMP EXAM - LIDS
COMMENTS: NORMAL
COMMENTS: NORMAL

## 2023-01-02 NOTE — LETTER
1/2/2023         RE: Erik Guardado  [address]  [place] [address]        Dear Colleague,    Thank you for referring your patient, Erik Guardado, to the [facility]. Please see a copy of my visit note below.     Current Eye Medications:  Visine as needed      Subjective:  Glaucoma evaluation per Dr. Adenike Loving.  Patient states he will have occasional blurred vision.  It has improved since he started wearing his glasses.   Patient denies any family history of glaucoma  No history of of trauma or surgery.     Objective:  See Ophthalmology Exam.       Assessment:  Baseline glaucoma OCT and Jean Visual Field within normal limits both eyes in patient who is a glaucoma suspect.  Corneas borderline thin.      Plan:  Continue observation with regard to glaucoma suspect status.   Continue biannual eye exams with Dr. Michela Loving or Dr. Daniel Agrawal.  Daniel Agrawal M.D.  [phone]          Again, thank you for allowing me to participate in the care of your patient.        Sincerely,        Daniel Agrawal MD

## 2023-01-02 NOTE — PATIENT INSTRUCTIONS
Continue observation with regard to glaucoma suspect status.   Continue biannual eye exams with Dr. Michela Loving or Dr. Daniel Agrawal.  Daniel Agrawal M.D.  [phone]

## 2023-01-02 NOTE — PROGRESS NOTES
Current Eye Medications:  Visine as needed      Subjective:  Glaucoma evaluation per Dr. Adenike Loving.  Patient states he will have occasional blurred vision.  It has improved since he started wearing his glasses.   Patient denies any family history of glaucoma  No history of of trauma or surgery.     Objective:  See Ophthalmology Exam.       Assessment:  Baseline glaucoma OCT and Jean Visual Field within normal limits both eyes in patient who is a glaucoma suspect.  Corneas borderline thin.      Plan:  Continue observation with regard to glaucoma suspect status.   Continue biannual eye exams with Dr. Michela Loving or Dr. Daniel Agrawal.  Daniel Agrawal M.D.  [phone]

## 2023-01-10 ENCOUNTER — MYC MEDICAL ADVICE (OUTPATIENT)
Dept: PEDIATRICS | Facility: CLINIC | Age: 53
End: 2023-01-10
Payer: COMMERCIAL

## 2023-01-23 ENCOUNTER — TELEPHONE (OUTPATIENT)
Dept: GASTROENTEROLOGY | Facility: CLINIC | Age: 53
End: 2023-01-23
Payer: COMMERCIAL

## 2023-01-23 NOTE — TELEPHONE ENCOUNTER
Caller: Erik Guardado  Reason for Reschedule/Cancellation (please be detailed, any staff messages or encounters to note?): out of town      Prior to reschedule please review:    Ordering Provider:mohan brown    Sedation per order:moderate    Does patient have any ASC Exclusions, please identify?: n      Notes on Cancelled Procedure:    Procedure:Lower Endoscopy [Colonoscopy]     Date: 2/1/23    Location:[facility]; [address], [place] [address]    Surgeon: jose cruz        Rescheduled: Yes    Procedure: Lower Endoscopy [Colonoscopy]     Date: 3/21/23    Location:[facility]; [address], [place] [id number]    Surgeon: Vincent    Sedation Level Scheduled  moderate,  Reason for Sedation Level on block    Prep/Instructions updated and sent: mich

## 2023-03-07 ENCOUNTER — TELEPHONE (OUTPATIENT)
Dept: GASTROENTEROLOGY | Facility: CLINIC | Age: 53
End: 2023-03-07
Payer: COMMERCIAL

## 2023-03-07 DIAGNOSIS — Z12.11 SCREEN FOR COLON CANCER: Primary | ICD-10-CM

## 2023-03-07 RX ORDER — BISACODYL 5 MG
TABLET, DELAYED RELEASE (ENTERIC COATED) ORAL
Qty: 4 TABLET | Refills: 0 | Status: SHIPPED | OUTPATIENT
Start: 2023-03-07 | End: 2023-03-21

## 2023-03-07 NOTE — TELEPHONE ENCOUNTER
Attempted to contact patient regarding upcoming Colonoscopy  procedure on 03.21.2023 for pre assessment questions. No answer.     Left message to return call to [phone] #[phone]    Discuss Covid policy and designated  policy.    Pre op exam? N/A    Arrival time: 0945. Procedure time: 1030    Facility location: [facility]; [address], [place] [address]    Sedation type: Conscious sedation     Anticoagulants: No    Electronic implanted devices? No    Diabetic? No    Indication for procedure: screening colonoscopy    Bowel prep recommendation: Standard Golytely      Prep instructions sent via [employer]. Bowel prep script sent to      [id number] - [place], [place] - [address]      Lizette Manzano RN  Endoscopy Procedure Pre Assessment RN

## 2023-03-13 NOTE — TELEPHONE ENCOUNTER
Pre assessment questions completed for upcoming Colonoscopy  procedure scheduled on 3/21/23    COVID policy reviewed.     Reviewed procedural arrival time 0945, procedure time 1030 and facility location [facility]; [address], [place] [address]    Designated  policy reviewed. Instructed to have someone stay 6 hours post procedure.     Anticoagulation/blood thinners? No    Electronic implanted devices? No    Diabetic? No    Reviewed procedure prep instructions.     Patient verbalized understanding and had no questions or concerns at this time.    Tracey Escalona RN  Endoscopy Procedure Pre Assessment RN

## 2023-03-21 ENCOUNTER — HOSPITAL ENCOUNTER (OUTPATIENT)
Facility: AMBULATORY SURGERY CENTER | Age: 53
Discharge: HOME | End: 2023-03-21
Attending: SURGERY | Admitting: SURGERY
Payer: COMMERCIAL

## 2023-03-21 VITALS
OXYGEN SATURATION: 92 % | TEMPERATURE: 97.4 F | HEART RATE: 61 BPM | DIASTOLIC BLOOD PRESSURE: 78 MMHG | RESPIRATION RATE: 16 BRPM | SYSTOLIC BLOOD PRESSURE: 117 MMHG

## 2023-03-21 LAB — COLONOSCOPY: NORMAL

## 2023-03-21 PROCEDURE — G8918 PT W/O PREOP ORDER IV AB PRO: HCPCS

## 2023-03-21 PROCEDURE — 88305 TISSUE EXAM BY PATHOLOGIST: CPT | Performed by: PATHOLOGY

## 2023-03-21 PROCEDURE — G8907 PT DOC NO EVENTS ON DISCHARG: HCPCS

## 2023-03-21 PROCEDURE — 45385 COLONOSCOPY W/LESION REMOVAL: CPT

## 2023-03-21 PROCEDURE — 45385 COLONOSCOPY W/LESION REMOVAL: CPT | Mod: PT | Performed by: SURGERY

## 2023-03-21 PROCEDURE — 99152 MOD SED SAME PHYS/QHP 5/>YRS: CPT | Mod: PT | Performed by: SURGERY

## 2023-03-21 RX ORDER — NALOXONE HYDROCHLORIDE 0.4 MG/ML
0.2 INJECTION, SOLUTION INTRAMUSCULAR; INTRAVENOUS; SUBCUTANEOUS
Status: DISCONTINUED | OUTPATIENT
Start: 2023-03-21 | End: 2023-03-22 | Stop reason: HOSPADM

## 2023-03-21 RX ORDER — NALOXONE HYDROCHLORIDE 0.4 MG/ML
0.4 INJECTION, SOLUTION INTRAMUSCULAR; INTRAVENOUS; SUBCUTANEOUS
Status: DISCONTINUED | OUTPATIENT
Start: 2023-03-21 | End: 2023-03-22 | Stop reason: HOSPADM

## 2023-03-21 RX ORDER — ONDANSETRON 2 MG/ML
4 INJECTION INTRAMUSCULAR; INTRAVENOUS
Status: DISCONTINUED | OUTPATIENT
Start: 2023-03-21 | End: 2023-03-22 | Stop reason: HOSPADM

## 2023-03-21 RX ORDER — PROCHLORPERAZINE MALEATE 10 MG
10 TABLET ORAL EVERY 6 HOURS PRN
Status: DISCONTINUED | OUTPATIENT
Start: 2023-03-21 | End: 2023-03-22 | Stop reason: HOSPADM

## 2023-03-21 RX ORDER — ONDANSETRON 4 MG/1
4 TABLET, ORALLY DISINTEGRATING ORAL EVERY 6 HOURS PRN
Status: DISCONTINUED | OUTPATIENT
Start: 2023-03-21 | End: 2023-03-22 | Stop reason: HOSPADM

## 2023-03-21 RX ORDER — OMEGA-3 FATTY ACIDS/FISH OIL 300-1000MG
200 CAPSULE ORAL EVERY 4 HOURS PRN
COMMUNITY

## 2023-03-21 RX ORDER — LIDOCAINE 40 MG/G
CREAM TOPICAL
Status: DISCONTINUED | OUTPATIENT
Start: 2023-03-21 | End: 2023-03-22 | Stop reason: HOSPADM

## 2023-03-21 RX ORDER — FENTANYL CITRATE 50 UG/ML
INJECTION, SOLUTION INTRAMUSCULAR; INTRAVENOUS PRN
Status: DISCONTINUED | OUTPATIENT
Start: 2023-03-21 | End: 2023-03-21 | Stop reason: HOSPADM

## 2023-03-21 RX ORDER — ONDANSETRON 2 MG/ML
4 INJECTION INTRAMUSCULAR; INTRAVENOUS EVERY 6 HOURS PRN
Status: DISCONTINUED | OUTPATIENT
Start: 2023-03-21 | End: 2023-03-22 | Stop reason: HOSPADM

## 2023-03-21 RX ORDER — FLUMAZENIL 0.1 MG/ML
0.2 INJECTION, SOLUTION INTRAVENOUS
Status: ACTIVE | OUTPATIENT
Start: 2023-03-21 | End: 2023-03-21
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
## 2023-03-21 NOTE — LETTER
Erik Guardado  [address]  [place] [address]    March 22, 2023      Dear Erik,  This letter is to inform you of the results of your pathology report from your colonoscopy.  Your pathology report was:  Final Diagnosis   A.  COLON, DESCENDING, POLYP:  - Tubular adenoma  - No high-grade dysplasia or malignancy identified  B.  COLON, SIGMOID, POLYP:  - Hyperplastic polyp   These are benign polyps. These types of polyps do carry a small risk of developing into a cancer over time if not removed. Yours were completely removed at the time of your colonoscopy. You should have another surveillance colonoscopy in 7 years.  If you have further questions please don t hesitate to call our clinic at [phone].   Sincerely,     Allen Kaur, DO

## 2023-03-22 LAB
PATH REPORT.COMMENTS IMP SPEC: NORMAL
PATH REPORT.COMMENTS IMP SPEC: NORMAL
PATH REPORT.FINAL DX SPEC: NORMAL
PATH REPORT.GROSS SPEC: NORMAL
PATH REPORT.MICROSCOPIC SPEC OTHER STN: NORMAL
PATH REPORT.RELEVANT HX SPEC: NORMAL
PHOTO IMAGE: NORMAL

## 2023-05-24 ENCOUNTER — OFFICE VISIT (OUTPATIENT)
Dept: DERMATOLOGY | Facility: CLINIC | Age: 53
End: 2023-05-24
Payer: COMMERCIAL

## 2023-05-24 DIAGNOSIS — R22.9 SUBCUTANEOUS NODULE: Primary | ICD-10-CM

## 2023-05-24 PROCEDURE — 99203 OFFICE O/P NEW LOW 30 MIN: CPT | Performed by: PHYSICIAN ASSISTANT
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
## 2023-05-30 ENCOUNTER — TELEPHONE (OUTPATIENT)
Dept: DERMATOLOGY | Facility: CLINIC | Age: 53
End: 2023-05-30
Payer: COMMERCIAL

## 2023-05-30 NOTE — TELEPHONE ENCOUNTER
Called and spoke to pt. scheduled consult in [facility] with Dr. Chase.     Noni Robledo, Procedure  5/30/2023 9:36 AM

## 2023-06-01 ENCOUNTER — HEALTH MAINTENANCE LETTER (OUTPATIENT)
Age: 53
End: 2023-06-01

## 2024-02-19 ENCOUNTER — OFFICE VISIT (OUTPATIENT)
Dept: URGENT CARE | Facility: URGENT CARE | Age: 54
End: 2024-02-19
Payer: COMMERCIAL

## 2024-02-19 VITALS
DIASTOLIC BLOOD PRESSURE: 78 MMHG | TEMPERATURE: 96.9 F | WEIGHT: 264 LBS | RESPIRATION RATE: 16 BRPM | OXYGEN SATURATION: 97 % | HEART RATE: 72 BPM | SYSTOLIC BLOOD PRESSURE: 144 MMHG | BODY MASS INDEX: 34.83 KG/M2

## 2024-02-19 DIAGNOSIS — B30.9 VIRAL CONJUNCTIVITIS OF RIGHT EYE: Primary | ICD-10-CM

## 2024-02-19 DIAGNOSIS — J06.9 VIRAL URI WITH COUGH: ICD-10-CM

## 2024-02-19 PROCEDURE — 99213 OFFICE O/P EST LOW 20 MIN: CPT

## 2024-02-19 RX ORDER — BENZONATATE 200 MG/1
200 CAPSULE ORAL 3 TIMES DAILY PRN
Qty: 21 CAPSULE | Refills: 0 | Status: SHIPPED | OUTPATIENT
Start: 2024-02-19

## 2024-02-19 NOTE — PROGRESS NOTES
"ASSESSMENT:  (B30.9) Viral conjunctivitis of right eye  (primary encounter diagnosis)  Plan: dextran 70-hypromellose (TEARS NATURALE FREE         PF) 0.1-0.3 % ophthalmic solution    (J06.9) Viral URI with cough  Plan: benzonatate (TESSALON) 200 MG capsule    PLAN:  We discussed that these symptoms are most likely viral in nature.   We discussed to rest, stay hydrated. Tylenol or ibuprofen as needed.  Tessalon Perles as prescribed for cough.  OTC cough/cold product, cough drops of patient's choice as needed.  We discussed cool compresses to right eye and artificial tears as needed for symptom relief.     Go to Emergency Room if sx worsen or change, Shortness of breath, chest pain, persistent fevers, or painful breathing occur.     Patient voiced understanding of instructions given.     Stephenie Myrick, LEXIE Student on 2/19/2024 at 11:37 AM    Physician Attestation   I agree with the information in this note.    AMANDA Alston CNP      SUBJECTIVE:  Erik Guardado is a 53 year old male who presents to the clinic today with a chief complaint of cough, chest sore from cough, right eye irritation for 1 week(s).  His cough is described as worse at night and spasmodic, intermittent during the day.  The eye irritation has been persistent for a couple of days - described as burning, and some yellow crust around it this morning. Reports the eye is itchy.  The patient's symptoms are severe \"when it happens\" and the patient describes it as barking and deep, stable.  Associated symptoms include chills, bloody nose. Denies fever, vomiting, diarrhea. The patient's symptoms are exacerbated by no particular triggers  Patient has been using OTC cough drops to improve symptoms.    Patient reports known sick exposure at work, unsure what illness.   Patient took an at home COVID test yesterday and it was negative.    ROS  Negative except noted above.     OBJECTIVE:  BP (!) 144/78 (BP Location: Left arm, Patient Position: " Sitting, Cuff Size: Adult Large)   Pulse 72   Temp 96.9  F (36.1  C) (Tympanic)   Resp 16   Wt 119.7 kg (264 lb)   SpO2 97%   BMI 34.83 kg/m    GENERAL APPEARANCE: healthy, alert and no distress  EYES: EOMI,  PERRL. Right eye with mild injection and increased tearing.   NECK: supple, nontender, no lymphadenopathy  RESP: lungs clear to auscultation - no rales, rhonchi or wheezes  CV: regular rates and rhythm, normal S1 S2, no murmur noted  NEURO: Normal strength and tone, sensory exam grossly normal,  normal speech and mentation  SKIN: no suspicious lesions or rashes

## 2024-02-19 NOTE — PATIENT INSTRUCTIONS
Your symptoms are related to a viral illness.  Get plenty of rest and drink fluids.  Can use Tylenol and/or ibuprofen as needed for pain and fever.  Maximum dose of Tylenol is 4000mg in a 24 hour period of time.  Take ibuprofen with food to avoid stomach upset.     Try taking tessalon pearls as prescribed for cough suppression.     Try cool compresses and artificial tears to the right eye for symptom relief.     Go to the emergency room if symptoms worsen or change, shortness of breath, chest pain, persistent fevers, or painful breathing occur.

## 2024-06-08 ENCOUNTER — HEALTH MAINTENANCE LETTER (OUTPATIENT)
Age: 54
End: 2024-06-08

## 2025-06-15 ENCOUNTER — HEALTH MAINTENANCE LETTER (OUTPATIENT)
Age: 55
End: 2025-06-15

## 2025-09-02 ENCOUNTER — OFFICE VISIT (OUTPATIENT)
Dept: URGENT CARE | Facility: URGENT CARE | Age: 55
End: 2025-09-02
Payer: COMMERCIAL

## 2025-09-02 VITALS
DIASTOLIC BLOOD PRESSURE: 85 MMHG | OXYGEN SATURATION: 97 % | TEMPERATURE: 99 F | SYSTOLIC BLOOD PRESSURE: 147 MMHG | HEART RATE: 90 BPM | WEIGHT: 267.6 LBS | RESPIRATION RATE: 20 BRPM | HEIGHT: 73 IN | BODY MASS INDEX: 35.47 KG/M2

## 2025-09-02 DIAGNOSIS — J02.9 SORE THROAT: ICD-10-CM

## 2025-09-02 DIAGNOSIS — J06.9 VIRAL URI WITH COUGH: Primary | ICD-10-CM

## 2025-09-02 LAB
DEPRECATED S PYO AG THROAT QL EIA: NEGATIVE
S PYO DNA THROAT QL NAA+PROBE: NOT DETECTED

## 2025-09-02 PROCEDURE — 87651 STREP A DNA AMP PROBE: CPT

## 2025-09-02 PROCEDURE — 3077F SYST BP >= 140 MM HG: CPT

## 2025-09-02 PROCEDURE — 99213 OFFICE O/P EST LOW 20 MIN: CPT

## 2025-09-02 PROCEDURE — 3079F DIAST BP 80-89 MM HG: CPT

## 2025-09-02 PROCEDURE — 1125F AMNT PAIN NOTED PAIN PRSNT: CPT

## 2025-09-02 ASSESSMENT — PAIN SCALES - GENERAL: PAINLEVEL_OUTOF10: SEVERE PAIN (8)

## (undated) DEVICE — PAD CHUX UNDERPAD 23X24" 7136

## (undated) DEVICE — KIT ENDO FIRST STEP DISINFECTANT 200ML W/POUCH EP-4

## (undated) RX ORDER — FENTANYL CITRATE 50 UG/ML
INJECTION, SOLUTION INTRAMUSCULAR; INTRAVENOUS
Status: DISPENSED
Start: 2023-03-21